# Patient Record
Sex: MALE | Race: BLACK OR AFRICAN AMERICAN | Employment: OTHER | URBAN - METROPOLITAN AREA
[De-identification: names, ages, dates, MRNs, and addresses within clinical notes are randomized per-mention and may not be internally consistent; named-entity substitution may affect disease eponyms.]

---

## 2017-10-12 ENCOUNTER — HOSPITAL ENCOUNTER (OUTPATIENT)
Dept: GENERAL RADIOLOGY | Age: 62
Discharge: HOME | End: 2017-10-12
Payer: OTHER GOVERNMENT

## 2017-10-12 DIAGNOSIS — M79.605 LEFT LEG PAIN: ICD-10-CM

## 2017-10-12 PROCEDURE — 73590 X-RAY EXAM OF LOWER LEG: CPT

## 2017-11-03 ENCOUNTER — HOSPITAL ENCOUNTER (OUTPATIENT)
Dept: CT IMAGING | Age: 62
Discharge: HOME | End: 2017-11-03
Attending: HOSPITALIST
Payer: OTHER GOVERNMENT

## 2017-11-03 ENCOUNTER — HOSPITAL ENCOUNTER (OUTPATIENT)
Dept: MRI IMAGING | Age: 62
Discharge: HOME | End: 2017-11-03
Attending: HOSPITALIST
Payer: OTHER GOVERNMENT

## 2017-11-03 VITALS — WEIGHT: 160 LBS

## 2017-11-03 DIAGNOSIS — R26.89 OTHER ABNORMALITIES OF GAIT AND MOBILITY: ICD-10-CM

## 2017-11-03 DIAGNOSIS — M79.605 LEFT LEG PAIN: ICD-10-CM

## 2017-11-03 DIAGNOSIS — R41.3 OTHER AMNESIA: ICD-10-CM

## 2017-11-03 LAB — CREAT UR-MCNC: 1.4 MG/DL (ref 0.6–1.3)

## 2017-11-03 PROCEDURE — 82565 ASSAY OF CREATININE: CPT

## 2017-11-03 PROCEDURE — 73720 MRI LWR EXTREMITY W/O&W/DYE: CPT

## 2017-11-03 PROCEDURE — A9577 INJ MULTIHANCE: HCPCS | Performed by: HOSPITALIST

## 2017-11-03 PROCEDURE — 74011250636 HC RX REV CODE- 250/636: Performed by: HOSPITALIST

## 2017-11-03 PROCEDURE — 70450 CT HEAD/BRAIN W/O DYE: CPT

## 2017-11-03 RX ADMIN — GADOBENATE DIMEGLUMINE 15 ML: 529 INJECTION, SOLUTION INTRAVENOUS at 09:59

## 2017-12-20 ENCOUNTER — HOSPITAL ENCOUNTER (OUTPATIENT)
Dept: PHYSICAL THERAPY | Age: 62
Discharge: HOME | End: 2017-12-20
Payer: OTHER GOVERNMENT

## 2017-12-20 PROCEDURE — 97162 PT EVAL MOD COMPLEX 30 MIN: CPT

## 2017-12-20 PROCEDURE — 97116 GAIT TRAINING THERAPY: CPT

## 2017-12-20 NOTE — PROGRESS NOTES
[facility]  [address] [place] [address], [address] - Phone: [phone]  Fax: [phone] / [address] [address] PHYSICAL THERAPY SERVICES  Patient Name: Kit Medeiros : 1955   Medical   Diagnosis: Gait instability [R26.81] Treatment Diagnosis: Gait instability [R26.81]   Onset Date: Summer 2017     Referral Source: Santana Reynolds DO Start of Care [facility]): 2017   Prior Hospitalization: See medical history Provider #: [id number]   Prior Level of Function: Independent with ADLs, ambulation   Comorbidities: Arthritis, hypothyroidism, HTN, urinary incontinence, possible CVA   Medications: Verified on Patient Summary List   The Plan of Care and following information is based on the information from the initial evaluation.   ===========================================================================================  Assessment / key information:  Patient is a 60 y.o. male who presents with complaints of imbalance, gait abnormality, multiple falls, B LE swelling L lower leg pain, cognitive deficits and urinary incontinence, which he and his sister report began summer 2017. Patient demonstrates slouched posture; shuffling gait pattern with decreased B foot clearance, decreased B step length and decreased stance time L LE; decreased B LE strength; decreased coordination; decreased balance (Romberg = 30\" eyes open, 22\" eyes closed) and impaired functional mobility/gait (Dynamic Gait Index = 12/24, indicating increased risk of falling). FOTO = 49/100. Patient was ambulating without assistive device, but was educated on proper technique for ambulation with SPC (which patient's sister brought to evaluation) to increase safety with ambulation. Patient would benefit from skilled PT services to address these issues and improve function.   Thank you for this referral.  ==========================================================================================  Eval Complexity: History: HIGH Complexity :3+ comorbidities / personal factors will impact the outcome/ POC Exam:HIGH Complexity : 4+ Standardized tests and measures addressing body structure, function, activity limitation and / or participation in recreation  Presentation: HIGH Complexity : Unstable and unpredictable characteristics  Clinical Decision Making:MEDIUM Complexity : FOTO score of 26-74Overall Complexity:MEDIUM    Problem List: pain affecting function, decrease ROM, decrease strength, edema affecting function, impaired gait/ balance, decrease ADL/ functional abilitiies, decrease activity tolerance, decrease flexibility/ joint mobility and decrease transfer abilities   Treatment Plan may include any combination of the following: Therapeutic exercise, Therapeutic activities, Neuromuscular re-education, Physical agent/modality, Gait/balance training, Manual therapy, Patient education, Functional mobility training, Home safety training and Stair training  Patient / Family readiness to learn indicated by: asking questions, trying to perform skills and interest  Persons(s) to be included in education: patient (P) and family support person (FSP);list sister  Barriers to Learning/Limitations: yes;  cognitive  Measures taken:    Patient Goal (s): \"Better balance, better walking. \"   Patient self reported health status: fair  Rehabilitation Potential: good   Short Term Goals: To be accomplished in  2  weeks:  1. Patient will demonstrate compliance with HEP. 2. Patient will maintain Romberg eyes closed 30\" to increase safety in challenging environments. 3. Patient will score greater than or equal to 14/24 on DGI to indicate increased balance with ambulation.  Long Term Goals: To be accomplished in  4  weeks:  1. Patient will demonstrate independence with HEP.   2. Patient will score greater than or equal to 16/24 on DGI to indicate increased balance with ambulation. 3. Patient will score greater than or equal to 59/100 on FOTO to indicate improved function. Frequency / Duration:   Patient to be seen  2  times per week for 4  weeks:  Patient / Caregiver education and instruction: activity modification and other ambulation with [facility]    Therapist Signature: Searfin Sheppard PT Date: 56/20/8634   Certification Period: NA Time: 5:23 PM   ===========================================================================================  I certify that the above Physical Therapy Services are being furnished while the patient is under my care. I agree with the treatment plan and certify that this therapy is necessary. Physician Signature:        Date:       Time:     Please sign and return to [employer] or you may fax the signed copy to [phone]. Thank you.

## 2017-12-20 NOTE — PROGRESS NOTES
PHYSICAL THERAPY - DAILY TREATMENT NOTE    Patient Name: Naty Keller        Date: 2017  : 1955   YES Patient  Verified  Visit #:   1     Insurance: Payor: Tonny Born / Plan: Naa Sers / Product Type: PPO /      In time: 4:05 Out time: 5:00   Total Treatment Time: 55     Medicare Time Tracking (below)   Total Timed Codes (min):  NA 1:1 Treatment Time:  NA     TREATMENT AREA =  Gait abnormality    SUBJECTIVE    Pain Level (on 0 to 10 scale):  0  / 10   Medication Changes/New allergies or changes in medical history, any new surgeries or procedures? NO    If yes, update Summary List   Subjective Functional Status/Changes:  []  No changes reported     Pt and pt's sister report difficulties with ambulation since summer 2017. Pt and pt's sister report that he has had LE swelling since summer 2017. Pt's sister also reports weight loss over same period of time. Pt's sister reports that pt was living alone, but has came to live with family in summer 2017 due to pt not taking care of himself and having a decline in function. Pt's sister reports that physician told them that pt may have had a stroke, but reports that CT scan was negative other than loss of volume. Pt is scheduled for MRI, which has not yet been approved. Pt's sister reports that he saw orthopedic surgeon, who told them that pt has arthritis in knees and fluid in his legs. Pt's sister reports that pt was offered cortisone injection for knees, but pt declined. Pt denies pain initially then admits to L lower leg pain (intermittent), denies numbness/tingling, denies dizziness. Pt reports imbalance. Pt reports that he has had multiple falls, unsure of cause. Pt reports that he does not use an assistive device. Pt lives with family in [facility], no [facility]. Pt's sister reports that there are 3 canes in home that are available to pt (quad cane, 2 canes).   Pt's sister reports difficulty with memory and understanding instructions.             OBJECTIVE    Physical Therapy Evaluation - Neurologic    Posture: [x] Poor    [] Fair    [] Good    Describe:   Protracted head/shoulders, slouched posture    Gait: [] Normal    [x] Abnormal    Device:  None    Describe: Shuffling gait pattern with decreased B foot clearance, decreased B step length    ROM:                             AROM    PROM   Shoulder Left Right Left Right   Flex [facility]     Ext [facility] WFL     ABD [facility]     ER [facility] W[facility]     IR WFL WFL              AROM    PROM   Knee Left Right Left Right   Ext [facility]     Flex [facility] WFL               AROM                           PROM  Hip Left Right Left Right   Flex [facility] WFL     Ext [facility] WFL     ABD [facility]     ER WFL WFL     IR WFL WFL                                              AROM      PROM   Ankle Left Right Left Right   Ext [facility]     Flex [facility] WFL       Strength (MMT):  Shoulder L (1-5) R (1-5)   Shoulder Flexion 5 5   Shoulder Ext 5 5   Shoulder ABD 5 5   Shoulder ADD 5 5   Shoulder IR 4 4   Shoulder ER 4 4                                             Hip L (1-5) R (1-5)   Hip Flexion 4 4   Hip Ext NT NT   Hip ABD NT NT   Hip ADD NT NT   Hip ER NT NT   Hip IR NT NT     Knee L (1-5) R (1-5)   Knee Flexion 4 4   Knee Extension 4 4   Ankle PF 5 5   Ankle DF 5 5   Other       Tone: WNL    Motor Control: Decreased speed and accuracy of movements    Sensation: NT    Reflexes: [x] Not Tested   Left Right   Biceps (C5)     Brachioradiais (C6)     Triceps (C7)     Knee Jerk (L4)     Ankle Jerk (SI)       Balance/ Equilibrium:              Left            Right  Tracks Across Midline yes  yes   Reaches Across Midline yes yes         Sitting Balance: Static:  [] Good    [x] Fair    [] Poor     Dynamic:   [] Good    [x] Fair    [] Poor        Standing Balance: Static:   [] Good    [] Fair    [x] Poor     Dynamic:   [] Good    [] Fair    [x] Poor        Protective Extension:  [x] Present    [] Delayed    [] Absent Romber\"/22\"        Functional Mobility      Bed Mobility:      Scooting: NT       Rolling: NT       Sit-Supine: NT      Transfers:       Sit-Stand: S to CG       Floor-Stand: NT      Gait:       Tandem: NT       Backwards: NT       Braiding: NT      Elevations:       Curbs: NT       Ramps: NT       Stairs: DGI    Behavior: [x] Cooperative    [] Impulsive    [] Agitated    [] Perseverative    [] Confused   Oriented x: 3    Cognition: [x] One Step Commands   [] Multiple Commands   [] Displays Neglect [] R  [] L    Other:       Impaired Judgement: [x] Y    [] N      Impaired Vision:  [] Y    [x] N      Safety Awareness Deficits  [x] Y    [] N      Impaired Hearing  [] Y    [x] N      Able to Express Needs [x] Y    [] N    Optional Tests:       Dynamic Gait Index (24pt scale):        Functional Gait Assessment (30pt scale):       Briggs Balance Scale (56pt scale):     Other test /comments:    15 min Gait Training: Gait training with SPC, SBQC; adjustment of pt's SPC   Rationale: improve gait to improve the patient's ability to perform ADLs/IADLs, functional mobility and gait safely and independently wtihout increased pain/symptoms        During eval, GT min Patient Education:  NO  Reviewed HEP   []  Progressed/Changed HEP based on:   Discussed POC; advised to use [facility] for safety with ambulation and instructed in proper technique     Other Objective/Functional Measures:    Instructed in proper technique for ambulation with SPC, SBQC; demonstrated improved safety with [facility] compared to [facility] due to keeping SBQC to close to body, posing a trip hazard; adjusted pt's SPC to proper height     Post Treatment Pain Level (on 0 to 10) scale:   0  / 10     ASSESSMENT    Assessment/Changes in Function:     Justification for Eval Code Complexity:  Patient History : HIGH - arthritis, hypothyroidism, HTN, possible CVA, urinary incontinence  Examination HIGH - See objective  Clinical Presentation: HIGH  Clinical Decision Making : MEDIUM - FOTO 49/100    See plan of care     []  See Progress Note/Recertification   Patient will continue to benefit from skilled PT services: see plan of care   Progress toward goals / Updated goals:    See plan of care     PLAN    [x]  Upgrade activities as tolerated YES Continue plan of care   []  Discharge due to :    []  Other:      Therapist: Adelina Pickett, PT    Date: 12/20/2017 Time: 4:10 PM

## 2017-12-27 ENCOUNTER — HOSPITAL ENCOUNTER (OUTPATIENT)
Dept: PHYSICAL THERAPY | Age: 62
Discharge: HOME | End: 2017-12-27
Payer: OTHER GOVERNMENT

## 2017-12-27 PROCEDURE — 97110 THERAPEUTIC EXERCISES: CPT

## 2017-12-27 PROCEDURE — 97530 THERAPEUTIC ACTIVITIES: CPT

## 2017-12-27 NOTE — PROGRESS NOTES
PHYSICAL THERAPY - DAILY TREATMENT NOTE    Patient Name: Linda Killian        Date: 2017  : 1955   YES Patient  Verified  Visit #:   2     Insurance: Payor: Yoli Lopes / Plan: Susy Jovel / Product Type: PPO /      In time: 5:30 Out time: 6:00   Total Treatment Time: 30     Medicare Time Tracking (below)   Total Timed Codes (min):  NA 1:1 Treatment Time:  NA     TREATMENT AREA =  Gait instability [R26.81]  SUBJECTIVE    Pain Level (on 0 to 10 scale):  0  / 10   Medication Changes/New allergies or changes in medical history, any new surgeries or procedures? NO    If yes, update Summary List   Subjective Functional Status/Changes:  []  No changes reported     Pt without complaints.           OBJECTIVE    15 min Therapeutic Activity: Sit to stand, gait with SPC   Rationale: improve transfers and gait to improve the patient's ability to perform ADLs/IADLs, functional mobility and gait safely and independently without increased pain/symptoms      15 min Neuromuscular Re-ed: EO/EC balance, EO/EC balance on foam, tap-ups, LAQ, stand knee flex, mini-squat   Rationale: increase strength, improve coordination and improve balance to improve the patients ability to perform ADLs/IADLs, functional mobility and gait safely and independently without increased pain/symptoms       During TE/TA min Patient Education:  YES  Reviewed HEP   [x]  Progressed/Changed HEP based on:   Initiated HEP     Other Objective/Functional Measures:    Initiated balance/strengthening ex and transfer/gait training per flowsheet  Patient demonstrated flexed posture, decreased step length and decreased B foot clearance with ambulation with SPC, which improved slightly with verbal cues     Post Treatment Pain Level (on 0 to 10) scale:   0  / 10     ASSESSMENT    Assessment/Changes in Function:     Tolerated exercises without complaints     []  See Progress Note/Recertification   Patient will continue to benefit from skilled PT services to modify and progress therapeutic interventions, address functional mobility deficits, address ROM deficits, address strength deficits, analyze and address soft tissue restrictions, analyze and cue movement patterns, analyze and modify body mechanics/ergonomics, assess and modify postural abnormalities, address imbalance/dizziness and instruct in home and community integration to attain remaining goals. Progress toward goals / Updated goals:    Progressing slowly toward goals - first visit since evaluation:  1. Patient will demonstrate compliance with HEP. - Initiated HEP  2. Patient will maintain Romberg eyes closed 30\" to increase safety in challenging environments. - MET  3. Patient will score greater than or equal to 14/24 on DGI to indicate increased balance with ambulation.        PLAN    [x]  Upgrade activities as tolerated YES Continue plan of care   []  Discharge due to :    []  Other:      Therapist: Ritu Cook PT    Date: 12/27/2017 Time: 5:52 PM     Future Appointments  Date Time Provider Chinyere Hinds   1/3/2018 5:30 PM Ritu Cook PT [facility]   1/8/2018 5:30 PM Christine Yeboah PT [facility]   1/10/2018 5:30 PM Ritu Cook, [address] [place] [address]   1/15/2018 5:30 PM Christine Yeboah PT [facility]   1/17/2018 5:30 PM Ritu Cook, [address] [place] [address]   1/24/2018 5:30 PM Ritu Cook PT [facility]   1/29/2018 5:30 PM Christine Yeboah PT [facility]   1/31/2018 5:30 PM Ritu Cook, PT [facility]

## 2018-01-03 ENCOUNTER — HOSPITAL ENCOUNTER (OUTPATIENT)
Dept: PHYSICAL THERAPY | Age: 63
Discharge: HOME | End: 2018-01-03
Payer: OTHER GOVERNMENT

## 2018-01-03 PROCEDURE — 97112 NEUROMUSCULAR REEDUCATION: CPT

## 2018-01-03 PROCEDURE — 97110 THERAPEUTIC EXERCISES: CPT

## 2018-01-08 ENCOUNTER — APPOINTMENT (OUTPATIENT)
Dept: PHYSICAL THERAPY | Age: 63
End: 2018-01-08
Payer: OTHER GOVERNMENT

## 2018-01-10 ENCOUNTER — APPOINTMENT (OUTPATIENT)
Dept: PHYSICAL THERAPY | Age: 63
End: 2018-01-10
Payer: OTHER GOVERNMENT

## 2018-01-15 ENCOUNTER — HOSPITAL ENCOUNTER (OUTPATIENT)
Dept: PHYSICAL THERAPY | Age: 63
Discharge: HOME | End: 2018-01-15
Payer: OTHER GOVERNMENT

## 2018-01-15 PROCEDURE — 97112 NEUROMUSCULAR REEDUCATION: CPT

## 2018-01-15 NOTE — PROGRESS NOTES
PHYSICAL THERAPY - DAILY TREATMENT NOTE    Patient Name: Bria Alfonso        Date: 1/15/2018  : 1955   YES Patient  Verified  Visit #:   4     Insurance: Payor: Cadence Shi / Plan: Abraham Holt / Product Type: PPO /      In time: 4:35 Out time: 5:05   Total Treatment Time: 30     Medicare Time Tracking (below)   Total Timed Codes (min):  NA 1:1 Treatment Time:  NA     TREATMENT AREA =  Gait instability [R26.81]    SUBJECTIVE    Pain Level (on 0 to 10 scale):  0  / 10   Medication Changes/New allergies or changes in medical history, any new surgeries or procedures? NO    If yes, update Summary List   Subjective Functional Status/Changes:  []  No changes reported     Reports he is not doing HEP because he has trouble remembering to do the exercises. Reports no falls since last treatment. OBJECTIVE    30 min Neuromuscular Re-ed:    Rationale:      increase ROM, increase strength, improve coordination, improve balance and increase proprioception to improve the patients ability to perform ADL's, gait, and functional mobility with increased safety and independence. min Patient Education:  YES  Reviewed HEP   []  Progressed/Changed HEP based on:   Cont HEP - advised patient to place HEP in a place where it is visible often to help him remember to do the exercises     Other Objective/Functional Measures:    Patient required physical assistance from therapist for MSR(instep) foot placement. EO MSR(instep): 30\" B  EC ROM: 30\"  EC MSR(instep): L 7\"/R 30\"    EO on AirEx stance: 30\" (Patient denied ROM on AirEx)  Patient denied EC on AirEx. Educated patient on placement of SPC to decrease fall risk. Post Treatment Pain Level (on 0 to 10) scale:   0  / 10     ASSESSMENT    Assessment/Changes in Function:     Patient demonstrated decreased stride length B when asked to multitask. Decreased foot clearance B with gait.      []  See Progress Note/Recertification   Patient will continue to benefit from skilled PT services to modify and progress therapeutic interventions, address functional mobility deficits, address ROM deficits, address strength deficits, analyze and address soft tissue restrictions, analyze and cue movement patterns, analyze and modify body mechanics/ergonomics, assess and modify postural abnormalities, address imbalance/dizziness and instruct in home and community integration to attain remaining goals. Progress toward goals / Updated goals:    Progressing toward goals:  1. Patient will demonstrate compliance with HEP. Cont HEP - advised him to place HEP in a place where it is visible often to help him remember to do exercises. 2. Patient will maintain Romberg eyes closed 30\" to increase safety in challenging environments. - MET  3. Patient will score greater than or equal to 14/24 on DGI to indicate increased balance with ambulation. Cont balance and strengthening exercises.      PLAN    [x]  Upgrade activities as tolerated YES Continue plan of care   []  Discharge due to :    []  Other:      Therapist: Dhara Singh PT    Date: 1/15/2018 Time: 4:48 PM     Future Appointments  Date Time Provider Chinyere Hinds   1/17/2018 5:30 PM Jeff Ferrer [address] [place] [address]   1/24/2018 5:30 PM Jeff Ferrer [address] [place] [address]   1/29/2018 5:30 PM Dhara Singh PT [facility]   1/31/2018 5:30 PM Jeff Ferrer PT [facility]

## 2018-01-24 ENCOUNTER — HOSPITAL ENCOUNTER (OUTPATIENT)
Dept: PHYSICAL THERAPY | Age: 63
Discharge: HOME | End: 2018-01-24
Payer: OTHER GOVERNMENT

## 2018-01-24 PROCEDURE — 97110 THERAPEUTIC EXERCISES: CPT

## 2018-01-24 PROCEDURE — 97530 THERAPEUTIC ACTIVITIES: CPT

## 2018-01-24 NOTE — PROGRESS NOTES
PHYSICAL THERAPY - DAILY TREATMENT NOTE    Patient Name: Jericho Bravo        Date: 2018  : 1955   YES Patient  Verified  Visit #:   5     Insurance: Payor: Tina Stoddard / Plan: Sammie Huertas / Product Type: PPO /      In time: 5:30 Out time: 6:05   Total Treatment Time: 35     Medicare Time Tracking (below)   Total Timed Codes (min):  NA 1:1 Treatment Time:  NA     TREATMENT AREA =  Gait instability [R26.81]  SUBJECTIVE    Pain Level (on 0 to 10 scale):  0  / 10   Medication Changes/New allergies or changes in medical history, any new surgeries or procedures? NO    If yes, update Summary List   Subjective Functional Status/Changes:  []  No changes reported     Pt reports that he would like to work on standing and lifting weights to get stronger.           OBJECTIVE    15 min Therapeutic Exercise:  [x]  See flow sheet   Rationale:      increase ROM, increase strength, improve coordination, improve balance and increase proprioception to improve the patients ability to perform ADLs/IADLs, functional mobility and gait safely and independently without increased pain/symptoms     20 min Therapeutic Activity: FOTO, DGI   Rationale: assess ADL/IADL function, functional mobility and gait to improve the patient's ability to perform ADLs/IADLs, functional mobility and gait safely and independently without increased pain/symptoms      DuringTE min Patient Education:  YES  Reviewed HEP   []  Progressed/Changed HEP based on:   Cont HEP     Other Objective/Functional Measures:    Nustep limited to 3.5 minutes due to c/o fatigue - O2 sat = 97%, HR = 90  Increased reps step-ups    B hip flex = 4/5, B knee flex/ext = 5/5, B ankle DF = 5/5    DGI =  with SPC; posterior LOB while ascending stairs, requiring min A from PT to correct    FOTO = 74/100     Post Treatment Pain Level (on 0 to 10) scale:   0  / 10     ASSESSMENT    Assessment/Changes in Function:     See progress note     [x]  See Progress Note/Recertification   Patient will continue to benefit from skilled PT services: see progress note   Progress toward goals / Updated goals:    See progress note     PLAN    [x]  Upgrade activities as tolerated YES Continue plan of care   []  Discharge due to :    []  Other:      Therapist: Beverly Gomez PT    Date: 1/24/2018 Time: 5:44 PM     Future Appointments  Date Time Provider Chinyere Hinds   1/29/2018 5:30 PM Reina Parks, PT [facility]   1/31/2018 5:30 PM Beverly Gomez, [address] [place] [address]   2/5/2018 5:30 PM Reina Parks, [address] [place] [address]   2/7/2018 5:30 PM Beverly Gomez, [address] [place] [address]   2/12/2018 5:30 PM Reina Parks, PT [facility]   2/14/2018 5:30 PM Beverly Gomez, [address] [place] [address]   2/19/2018 5:30 PM Reina Parks PT [facility]   2/21/2018 5:30 PM Beverly Gomez [address] [place] [address]   2/26/2018 5:30 PM Reina Parks PT [facility]   2/28/2018 5:30 PM Beverly Gomez PT [facility]

## 2018-01-24 NOTE — PROGRESS NOTES
[address] PHYSICAL THERAPY  [address] [place] [address] Victoriano Escobedo, [address] [employer] - Phone: [phone]  Fax: [phone]  PROGRESS NOTE  Patient Name: Carola Martin : 1955   Treatment/Medical Diagnosis: Gait instability [R26.81]   Referral Source: Jerome Dhaliwal DO     Date of Initial Visit: 2017 Attended Visits: 5 Missed Visits: 2     SUMMARY OF TREATMENT  Treatment has consisted of initial evaluation and 4 treatment sessions, which have included LE strengthening exercises, balance training, functional mobility/gait training and patient/family education, including HEP. CURRENT STATUS  Patient has demonstrated limited progress with PT to this point, possibly due to decreased carryover due to cognitive deficits and limited attendance due to inclement weather. Patient has progressed slowly with exercises in clinic. Patient reports poor compliance with HEP, possibly due to cognitive deficits. FOTO has increased from 49/100 to 74/100, suggesting improved function/decreased functional limitation; however, validity of results potentially affected by cognitive deficits. Dynamic Gait Index (DGI) has decreased slightly, but not significantly, from  to . Patient demonstrates posterior LOB during stair negotiation portion of DGI, requiring min A from PT to correct. Patient ambulates in clinic with SPC, intermittently carrying SPC instead of placing it on ground; however, patient's sister reports that patient does not use SPC at home. Static standing balance has improved slightly with patient able to maintain modified sharpened Romberg with heel at instep eyes open 30\"B, Romberg with eyes closed 30\" and foam stance eyes open 30\". LE strength increased slightly with B hip flex = 4/5, B knee flex/ext = 5/5 and B ankle DF = 5/5. Goal/Measure of Progress Goal Met? 1. Patient will demonstrate compliance with HEP.    Status at last Eval: NA Current Status: Poor compliance with HEP no   2. Patient will maintain Romberg eyes closed 30\" to increase safety in challenging environments. Status at last Eval: Rom EC = 22\" Current Status: Rom EC = 30\" yes   3. Patient will score greater than or equal to 14/24 on DGI to indicate increased balance with ambulation. Status at last Eval: DGI = 12/24 Current Status: DGI = 11/24 no     New Goals to be achieved in __2-4__  weeks:  1. Patient will demonstrate independence with HEP. 2.  Patient will score greater than or equal to 16/24 on DGI to indicate increased balance with ambulation. 3.  Patient will score greater than or equal to 59/100 on FOTO to indicate improved function. RECOMMENDATIONS  Patient may benefit from continued skilled PT services to address decreased strength, decreased balance, decreased activity tolerance and impaired functional mobility/gait to allow increased safety and independence with ADLs/IADLs, functional mobility and gait. Cognitive deficits and inconsistent attendance are potential barriers to progress. Will reassess progress in 2 weeks. If you have any questions/comments please contact us directly at [phone]. Thank you for allowing us to assist in the care of your patient. Therapist Signature: Misty Guallpa PT Date: 1/24/2018     Time: 6:12 PM   NOTE TO PHYSICIAN:  PLEASE COMPLETE THE ORDERS BELOW AND FAX TO   [facility] Physical Therapy: [phone]. If you are unable to process this request in 24 hours please contact our office: [phone].    ___ I have read the above report and request that my patient continue as recommended.   ___ I have read the above report and request that my patient continue therapy with the following changes/special instructions:_________________________________________________________   ___ I have read the above report and request that my patient be discharged from therapy.      Physician Signature:        Date: Time:

## 2018-01-29 ENCOUNTER — HOSPITAL ENCOUNTER (OUTPATIENT)
Dept: PHYSICAL THERAPY | Age: 63
Discharge: HOME | End: 2018-01-29
Payer: OTHER GOVERNMENT

## 2018-01-29 PROCEDURE — 97112 NEUROMUSCULAR REEDUCATION: CPT

## 2018-01-29 NOTE — PROGRESS NOTES
PHYSICAL THERAPY - DAILY TREATMENT NOTE    Patient Name: Silas Stoll        Date: 2018  : 1955   YES Patient  Verified  Visit #:     Insurance: Payor: Acacia Dave / Plan: Wendy Sepulveda / Product Type: PPO /      In time: 5:35 Out time: 5:55   Total Treatment Time: 20     Medicare Time Tracking (below)   Total Timed Codes (min):  NA 1:1 Treatment Time:  NA     TREATMENT AREA =  Gait instability [R26.81]    SUBJECTIVE    Pain Level (on 0 to 10 scale):  0  / 10   Medication Changes/New allergies or changes in medical history, any new surgeries or procedures? NO    If yes, update Summary List   Subjective Functional Status/Changes:  []  No changes reported     Reports he is doing his exercises 1-2x/day. Reports his main problem is \"getting here. \"  \"My sister wants to stop bringing me here. \"   Reports no falls since last treatment. States he still feels off balance at home. \"I don't move fast enough. \"  When asked if he was safe when he is walking, he replies \"yes. \"         OBJECTIVE    20 min Neuromuscular Re-ed:    Rationale:      improve coordination, improve balance and increase proprioception to improve the patients ability to perform ADL's, gait, and functional mobility with increased safety and independence.        min Patient Education:  YES  Reviewed HEP   []  Progressed/Changed HEP based on:   Advised to cont HEP     Other Objective/Functional Measures:    DGI = 12/24     Post Treatment Pain Level (on 0 to 10) scale:   0  / 10     ASSESSMENT    Assessment/Changes in Function:     See DC note to Md     []  See Progress Note/Recertification      Progress toward goals / Updated goals:    See DC note to MD     PLAN    [x]  Upgrade activities as tolerated YES Continue plan of care   []  Discharge due to :    []  Other:      Therapist: Reina Parks PT    Date: 2018 Time: 1:18 PM     Future Appointments  Date Time Provider Chinyere Hinds   2018 5:30 PM Stevie Zavala, PT [facility]   1/31/2018 5:30 PM Cheri Courser, PT [facility]   2/5/2018 5:30 PM Stevie Zavala, PT [facility]   2/7/2018 5:30 PM Cheri Courser, PT [facility]   2/12/2018 5:30 PM Stevie Zavala, PT [facility]   2/14/2018 5:30 PM Cheri Courser, [facility]   2/19/2018 5:30 PM Stevie Zavala, PT [facility]   2/21/2018 5:30 PM Hceri Courser, PT [facility]   2/26/2018 5:30 PM Stevie Zavala, PT [facility]   2/28/2018 5:30 PM Cheri Courser, PT [facility]

## 2018-01-29 NOTE — PROGRESS NOTES
[address] PHYSICAL THERAPY  [address] [place] [address] Nina Escobedo, [address] - Phone: [phone]  Fax: [phone] SUMMARY  Patient Name: Latosha Shin : 1955   Treatment/Medical Diagnosis: Gait instability [R26.81]   Referral Source: Emily Rivers DO     Date of Initial Visit: 2017 Attended Visits: 6 Missed Visits: 2     SUMMARY OF TREATMENT  Treatment has consisted of LE strengthening exercises, balance training, patient/family education, and home exercise program.  CURRENT STATUS  Patient presented to the clinic today requesting discharge due to transportation issues. He states, \"My sister doesn't want to bring me anymore. \"  Please refer to progress note dated 2018 for objective data. The Dynamic Gait Index was repeated today with a score of 12/ (patient able to negotiate steps today without min A), indicating increased fall risk. Goal/Measure of Progress Goal Met? 1. Patient will demonstrate independence with HEP. Status at last Eval: Poor compliance with HEP Current Status: Partial compliance with HEP no   2. Patient will score greater than or equal to 16/24 on DGI to indicate increased balance with ambulation. Status at last Eval:  Current Status: 12 no   3. Patient will score greater than or equal to 59/100 on FOTO to indicate improved function. Status at last Eval: 49/100 Current Status: 74/100 (on 2018) yes     G-Codes: NA  RECOMMENDATIONS  Discontinue therapy per patient request.    If you have any questions/comments please contact us directly at [phone]. Thank you for allowing us to assist in the care of your patient.     Therapist Signature: Chicho Caba, PT Date: 2018     Time: 5:59 PM

## 2018-01-31 ENCOUNTER — APPOINTMENT (OUTPATIENT)
Dept: PHYSICAL THERAPY | Age: 63
End: 2018-01-31
Payer: OTHER GOVERNMENT

## 2018-02-05 ENCOUNTER — APPOINTMENT (OUTPATIENT)
Dept: PHYSICAL THERAPY | Age: 63
End: 2018-02-05

## 2018-02-06 ENCOUNTER — HOSPITAL ENCOUNTER (OUTPATIENT)
Dept: MRI IMAGING | Age: 63
Discharge: HOME | End: 2018-02-06
Attending: HOSPITALIST
Payer: OTHER GOVERNMENT

## 2018-02-06 ENCOUNTER — APPOINTMENT (OUTPATIENT)
Dept: MRI IMAGING | Age: 63
End: 2018-02-06
Attending: HOSPITALIST
Payer: OTHER GOVERNMENT

## 2018-02-06 ENCOUNTER — HOSPITAL ENCOUNTER (OUTPATIENT)
Dept: NON INVASIVE DIAGNOSTICS | Age: 63
Discharge: HOME | End: 2018-02-06
Attending: NURSE PRACTITIONER
Payer: OTHER GOVERNMENT

## 2018-02-06 VITALS — WEIGHT: 160 LBS

## 2018-02-06 DIAGNOSIS — R60.9 EDEMA: ICD-10-CM

## 2018-02-06 DIAGNOSIS — R41.3 OTHER AMNESIA: ICD-10-CM

## 2018-02-06 LAB — CREAT UR-MCNC: 1.4 MG/DL (ref 0.6–1.3)

## 2018-02-06 PROCEDURE — 82565 ASSAY OF CREATININE: CPT

## 2018-02-06 PROCEDURE — 70553 MRI BRAIN STEM W/O & W/DYE: CPT

## 2018-02-06 PROCEDURE — A9577 INJ MULTIHANCE: HCPCS | Performed by: HOSPITALIST

## 2018-02-06 PROCEDURE — 74011250636 HC RX REV CODE- 250/636: Performed by: HOSPITALIST

## 2018-02-06 PROCEDURE — 93306 TTE W/DOPPLER COMPLETE: CPT

## 2018-02-06 RX ADMIN — GADOBENATE DIMEGLUMINE 15 ML: 529 INJECTION, SOLUTION INTRAVENOUS at 08:42

## 2018-02-07 ENCOUNTER — APPOINTMENT (OUTPATIENT)
Dept: PHYSICAL THERAPY | Age: 63
End: 2018-02-07

## 2018-02-12 ENCOUNTER — APPOINTMENT (OUTPATIENT)
Dept: PHYSICAL THERAPY | Age: 63
End: 2018-02-12

## 2018-02-14 ENCOUNTER — APPOINTMENT (OUTPATIENT)
Dept: PHYSICAL THERAPY | Age: 63
End: 2018-02-14

## 2018-02-19 ENCOUNTER — APPOINTMENT (OUTPATIENT)
Dept: PHYSICAL THERAPY | Age: 63
End: 2018-02-19

## 2018-02-21 ENCOUNTER — APPOINTMENT (OUTPATIENT)
Dept: PHYSICAL THERAPY | Age: 63
End: 2018-02-21

## 2018-02-26 ENCOUNTER — APPOINTMENT (OUTPATIENT)
Dept: PHYSICAL THERAPY | Age: 63
End: 2018-02-26

## 2018-02-28 ENCOUNTER — APPOINTMENT (OUTPATIENT)
Dept: PHYSICAL THERAPY | Age: 63
End: 2018-02-28

## 2018-06-02 NOTE — PROGRESS NOTES
PHYSICAL THERAPY - DAILY TREATMENT NOTE    Patient Name: Bob Kirkland        Date: 1/3/2018  : 1955   YES Patient  Verified  Visit #:   3     Insurance: Payor: Олег Gerardoer / Plan: Alivia Bunn / Product Type: PPO /      In time: 5:25 Out time: 6:00   Total Treatment Time: 35     Medicare Time Tracking (below)   Total Timed Codes (min):  NA 1:1 Treatment Time:  NA     TREATMENT AREA =  Gait instability [R26.81]  SUBJECTIVE    Pain Level (on 0 to 10 scale):  0  / 10   Medication Changes/New allergies or changes in medical history, any new surgeries or procedures? NO    If yes, update Summary List   Subjective Functional Status/Changes:  []  No changes reported     Pt without complaints. Pt reports that he has not been doing HEP, reports that he lost HEP printouts.           OBJECTIVE    27 min Therapeutic Exercise:  [x]  See flow sheet   Rationale:      increase ROM, increase strength, improve coordination, improve balance and increase proprioception to improve the patients ability to perform ADLs/IADLs, functional mobility and gait safely and independently without increased pain/symptoms     8 min Neuromuscular Re-ed: EO/EC balance, tap-ups   Rationale: improve balance to improve the patient's ability to perform ADLs/IADLs, functional mobility and gait safely and independently without increased pain/symptoms      During TE/NM min Patient Education:  YES  Reviewed HEP   [x]  Progressed/Changed HEP based on:   Issued new copy of HEP     Other Objective/Functional Measures:    Initiated Nustep  Initiated stand hip 3-way, step-ups  Progressed EO balance   Post Treatment Pain Level (on 0 to 10) scale:   0  / 10     ASSESSMENT    Assessment/Changes in Function:     Tolerated exercises without complaints     []  See Progress Note/Recertification   Patient will continue to benefit from skilled PT services to modify and progress therapeutic interventions, address functional mobility deficits, address ROM deficits, address strength deficits, analyze and address soft tissue restrictions, analyze and cue movement patterns, analyze and modify body mechanics/ergonomics, assess and modify postural abnormalities, address imbalance/dizziness and instruct in home and community integration to attain remaining goals. Progress toward goals / Updated goals:    Progressing toward goals:  1. Patient will demonstrate compliance with HEP. - Reissued HEP  2. Patient will maintain Romberg eyes closed 30\" to increase safety in challenging environments. - MET  3. Patient will score greater than or equal to 14/24 on DGI to indicate increased balance with ambulation.        PLAN    [x]  Upgrade activities as tolerated YES Continue plan of care   []  Discharge due to :    []  Other:      Therapist: Joaquim Levy PT    Date: 1/3/2018 Time: 5:25 PM     Future Appointments  Date Time Provider Chinyere Hinds   1/3/2018 5:30 PM Joaquim Levy PT [facility]   1/8/2018 5:30 PM Shailesh Hale PT [facility]   1/10/2018 5:30 PM Joaquim Levy [address] [place] [address]   1/15/2018 5:30 PM Shailesh Hale PT [facility]   1/17/2018 5:30 PM Joaquim Levy [address] [place] [address]   1/24/2018 5:30 PM Joaquim Levy PT [facility]   1/29/2018 5:30 PM Shailesh Hale PT [facility]   1/31/2018 5:30 PM Joaquim Levy PT [facility] Adequate: hears normal conversation without difficulty

## 2018-06-22 ENCOUNTER — HOSPITAL ENCOUNTER (OUTPATIENT)
Dept: MRI IMAGING | Age: 63
Discharge: HOME | End: 2018-06-22
Attending: PSYCHIATRY & NEUROLOGY
Payer: COMMERCIAL

## 2018-06-22 VITALS — WEIGHT: 150 LBS

## 2018-06-22 DIAGNOSIS — R26.0 ATAXIC GAIT: ICD-10-CM

## 2018-06-22 LAB — CREAT UR-MCNC: 1.2 MG/DL (ref 0.6–1.3)

## 2018-06-22 PROCEDURE — 74011250636 HC RX REV CODE- 250/636: Performed by: PSYCHIATRY & NEUROLOGY

## 2018-06-22 PROCEDURE — A9575 INJ GADOTERATE MEGLUMI 0.1ML: HCPCS | Performed by: PSYCHIATRY & NEUROLOGY

## 2018-06-22 PROCEDURE — 72156 MRI NECK SPINE W/O & W/DYE: CPT

## 2018-06-22 PROCEDURE — 82565 ASSAY OF CREATININE: CPT

## 2018-06-22 RX ORDER — GADOTERATE MEGLUMINE 376.9 MG/ML
10 INJECTION INTRAVENOUS
Status: COMPLETED | OUTPATIENT
Start: 2018-06-22 | End: 2018-06-22

## 2018-06-22 RX ADMIN — GADOTERATE MEGLUMINE 10 ML: 376.9 INJECTION INTRAVENOUS at 09:53

## 2018-08-22 PROBLEM — R97.20 ELEVATED PSA: Status: ACTIVE | Noted: 2018-08-22

## 2018-11-09 ENCOUNTER — DOCUMENTATION ONLY (OUTPATIENT)
Dept: INTERNAL MEDICINE CLINIC | Age: 63
End: 2018-11-09

## 2018-11-09 ENCOUNTER — OFFICE VISIT (OUTPATIENT)
Dept: INTERNAL MEDICINE CLINIC | Age: 63
End: 2018-11-09

## 2018-11-09 VITALS
SYSTOLIC BLOOD PRESSURE: 141 MMHG | WEIGHT: 177.4 LBS | HEART RATE: 66 BPM | TEMPERATURE: 97.5 F | OXYGEN SATURATION: 98 % | BODY MASS INDEX: 26.28 KG/M2 | DIASTOLIC BLOOD PRESSURE: 82 MMHG | HEIGHT: 69 IN | RESPIRATION RATE: 20 BRPM

## 2018-11-09 DIAGNOSIS — R97.20 ELEVATED PSA: ICD-10-CM

## 2018-11-09 DIAGNOSIS — Z23 ENCOUNTER FOR IMMUNIZATION: ICD-10-CM

## 2018-11-09 DIAGNOSIS — F01.50 VASCULAR DEMENTIA WITHOUT BEHAVIORAL DISTURBANCE (HCC): Primary | ICD-10-CM

## 2018-11-09 DIAGNOSIS — E78.5 HYPERLIPIDEMIA, UNSPECIFIED HYPERLIPIDEMIA TYPE: ICD-10-CM

## 2018-11-09 RX ORDER — ATORVASTATIN CALCIUM 80 MG/1
80 TABLET, FILM COATED ORAL DAILY
Qty: 90 TAB | Refills: 3 | Status: SHIPPED | OUTPATIENT
Start: 2018-11-09 | End: 2021-03-30 | Stop reason: SDUPTHER

## 2018-11-09 NOTE — PATIENT INSTRUCTIONS
Helping A Person With Dementia: Care Instructions  Your Care Instructions    Dementia is a loss of mental skills that affects daily life. It is different from mild memory loss that occurs with aging. Dementia can cause problems with memory, thinking clearly, and planning. It is different for everyone. But it usually gets worse slowly. Some people who have dementia can function well for a long time. But at some point it may become hard for the person to care for himself or herself. It can be upsetting to learn that a loved one has this condition. You may be afraid and worried about what will happen. You may wonder how you will care for the person. There is no cure for dementia. But medicine may be able to slow memory loss and improve thinking for a while. Other medicines may help with sleep, depression, and behavior changes. Dementia is different for everyone. In some cases, people can function well for a long time. You can help your loved one by making his or her home life easier and safer. You also need to take care of yourself. Caregiving can be stressful. But support is available to help you and give you a break when you need it. The Alzheimer's Association offers good information and support. If you are caring for someone with dementia, you can help make life safer and more comfortable. You can also help your loved one make decisions about future care. You may also want to bring up legal and financial issues. These are hard but important conversations to have. Follow-up care is a key part of your loved one's treatment and safety. Be sure to make and go to all appointments, and call your doctor if your loved one is having problems. It's also a good idea to know your loved one's test results and keep a list of the medicines he or she takes. How can you care for your loved one at home? Taking care of the person  · If the person takes medicine for dementia, help him or her take it exactly as prescribed. Call the doctor if you notice any problems with the medicine. · Make a list of the person's medicines. Review it with all of his or her doctors. · Help the person eat a balanced diet. Serve plenty of whole grains, fruits, and vegetables every day. If the person is not hungry at mealtimes, give snacks at midmorning and in the afternoon. Offer drinks such as Boost, Ensure, or Sustacal if the person is losing weight. · Encourage exercise. Walking and other activities may slow the decline of mental ability. Help the person stay active mentally with reading, crossword puzzles, or other hobbies. · Take steps to help if the person is sundowning. This is the restless behavior and trouble with sleeping that may occur in late afternoon and at night. Try not to let the person nap during the day. Offer a glass of warm milk or caffeine-free tea before bedtime. · Develop a routine. Your loved one will feel less frustrated or confused with a clear, simple plan of what to do every day. · Be patient. A task may take the person longer than it used to. · For as long as he or she is able, allow your loved one to make decisions about activities, food, clothing, and other choices. Let him or her be independent, even if tasks take more time or are not done perfectly. Tailor tasks to the person's abilities. For example, if cooking is no longer safe, ask for other help. Your loved one can help set the table, or make simple dishes such as a salad. When the person needs help, offer it gently. Staying safe  · Make your home (or your loved one's home) safe. Tack down rugs, and put no-slip tape in the tub. Install handrails, and put safety switches on stoves and appliances. Keep rooms free of clutter. Make sure walkways around furniture are clear. Do not move furniture around, because the person may become confused. · Use locks on doors and cupboards.  Lock up knives, scissors, medicines, cleaning supplies, and other dangerous things. · Do not let the person drive or cook if he or she can't do it safely. A person with dementia should not drive unless he or she is able to pass an on-road driving test. Your state 's license bureau can do a driving test if there is any question. · Get medical alert jewelry for the person so that you can be contacted if he or she wanders away. If possible, provide a safe place for wandering, such as an enclosed yard or garden. Taking care of yourself  · Ask your doctor about support groups and other resources in your area. · Take care of your health. Be sure to eat healthy foods and get enough rest and exercise. · Take time for yourself. Respite services provide someone to stay with the person for a short time while you get out of the house for a few hours. · Make time for an activity that you enjoy. Read, listen to music, paint, do crafts, or play an instrument, even if it's only for a few minutes a day. · Spend time with family, friends, and others in your support system. When should you call for help? Call 911 anytime you think the person may need emergency care. For example, call if:    · The person who has dementia wanders away and you can't find him or her.     · The person who has dementia is seriously injured.    Call the doctor now or seek immediate medical care if:    · The person suddenly sees things that are not there (hallucinates).     · The person has a sudden change in his or her behavior.    Watch closely for changes in the person's health, and be sure to contact the doctor if:    · The person has symptoms that could cause injury.     · The person has problems with his or her medicine.     · You need more information to care for a person with dementia.     · You need respite care so you can take a break. Where can you learn more? Go to http://mark-earlene.info/.   Enter O132 in the search box to learn more about \"Helping A Person With Dementia: Care Instructions. \"  Current as of: December 7, 2017  Content Version: 11.8  © 5256-5014 Healthwise, [facility]. Care instructions adapted under license by [employer] (which disclaims liability or warranty for this information). If you have questions about a medical condition or this instruction, always ask your healthcare professional. [place] [address] any warranty or liability for your use of this information.

## 2018-11-09 NOTE — PROGRESS NOTES
HISTORY OF PRESENT ILLNESS  Arpan Garcia is a 58 y.o. male. 59 yo male here to establish care, f/u of dementia. Accompanied by his daughter who lives in [place]. He does have mother and sisters living locally. Daughter reports cognitive decline first noted  15 years ago after the death of his father. More difficulty with self care needs a few years ago after death of his wife. Had been living in [facility]. Has son in that area. His daughter reports he has seen Neuro (Dr. Philippe Dunbar) and was dx with vascular dementia. Is being treated with Aricept, currently taking 10 mg daily. Daughter feels this is helping as he had more behavioral issues recently after running out. He currently lives alone in an apt. He reports he can cook for himself though daughter has concerns with his ADL abilities. She also has safety concerns: he will walk to convenience store to use [employer], forget how to use, and ask strangers help him take out money. She reports money has been taken from his pocket when walking to the store. He has also accepted rides from strangers coming home from the grocery store. BP is stable at this time without medication  He is taking Lipitor for HLD. Needs refill  Had elevated PSA on prior labs. Had been refer to urology with bx planned but canceled while treated for UTI and has not yet been rescheduled. Establish Care   Pertinent negatives include no chest pain, no headaches and no shortness of breath. Immunization/Injection   Pertinent negatives include no chest pain, no headaches and no shortness of breath. No complaints; answers no to ROS questions but quiet for most of visit. Review of Systems   Unable to perform ROS: Dementia   Constitutional: Negative for chills, fever and weight loss. HENT: Negative for congestion and ear pain. Eyes: Negative for blurred vision and pain. Respiratory: Negative for cough and shortness of breath.     Cardiovascular: Negative for chest pain, palpitations and leg swelling. Gastrointestinal: Negative for nausea and vomiting. Genitourinary: Negative for frequency and urgency. Musculoskeletal: Negative for joint pain and myalgias. Skin: Negative for itching and rash. Neurological: Negative for dizziness, tingling and headaches. Psychiatric/Behavioral: Negative for depression. The patient is not nervous/anxious. Past Medical History:   Diagnosis Date    Cerebellar stroke syndrome     Elevated PSA     HTN (hypertension)     Memory loss     Vascular dementia    History reviewed. No pertinent surgical history. Current Outpatient Medications on File Prior to Visit   Medication Sig Dispense Refill    donepezil (ARICEPT) 5 mg tablet Take  by mouth nightly.  aspirin delayed-release 81 mg tablet Take  by mouth daily.  levoFLOXacin (LEVAQUIN) 750 mg tablet Take 2 hours prior procedure 1 Tab 0     No current facility-administered medications on file prior to visit.     No Known Allergies  Family History   Problem Relation Age of Onset    Diabetes Mother     Hypertension Mother     Breast Cancer Mother     Cancer Mother      Social History     Socioeconomic History    Marital status:      Spouse name: Not on file    Number of children: Not on file    Years of education: Not on file    Highest education level: Not on file   Social Needs    Financial resource strain: Not on file    Food insecurity - worry: Not on file    Food insecurity - inability: Not on file   [employer] needs - medical: Not on file   [employer] needs - non-medical: Not on file   Occupational History    Not on file   Tobacco Use    Smoking status: Never Smoker    Smokeless tobacco: Never Used   Substance and Sexual Activity    Alcohol use: No    Drug use: No    Sexual activity: Not Currently   Other Topics Concern    Not on file   Social History Narrative    ** Merged History Encounter **          Physical Exam   Constitutional: He appears well-developed and well-nourished. No distress. /82 (BP 1 Location: Right arm, BP Patient Position: Sitting)   Pulse 66   Temp 97.5 °F (36.4 °C) (Oral)   Resp 20   Ht 5' 9\" (1.753 m)   Wt 177 lb 6.4 oz (80.5 kg)   SpO2 98%   BMI 26.20 kg/m²      Eyes: EOM are normal. Right eye exhibits no discharge. Left eye exhibits no discharge. No scleral icterus. Neck: Neck supple. Cardiovascular: Normal rate, regular rhythm and normal heart sounds. Exam reveals no gallop and no friction rub. No murmur heard. Pulmonary/Chest: Effort normal and breath sounds normal. No respiratory distress. He has no wheezes. He has no rales. Musculoskeletal: He exhibits no edema or tenderness. Lymphadenopathy:     He has no cervical adenopathy. Neurological: He is alert. He exhibits normal muscle tone. Skin: Skin is warm and dry. Psychiatric: He has a normal mood and affect. No results found for: NA, K, CL, CO2, AGAP, GLU, BUN, CREA, BUCR, GFRAA, GFRNA, CA, TBIL, TBILI, GPT, SGOT, AP, TP, ALB, GLOB, AGRAT, ALT    ASSESSMENT and PLAN    ICD-10-CM ICD-9-CM    1. Vascular dementia without behavioral disturbance F01.50 290.40    2. Elevated PSA R97.20 790.93    3. Encounter for immunization Z23 V03.89 INFLUENZA VIRUS VAC QUAD,SPLIT,PRESV FREE SYRINGE IM   4. Hyperlipidemia, unspecified hyperlipidemia type E78.5 272.4      Will request old records. Discussed dementia and care needs. Agree with no driving. Daughter will look into alternative living situations for safety  F/u with urology for elevated PSA. [facility] Discussed with pt and daughter risks/benefits of possible treatment options  Will continue with current medication for now  Flu shot today.

## 2018-11-09 NOTE — PROGRESS NOTES
ROOM # 16  Identified pt with two pt identifiers(name and ). Reviewed record in preparation for visit and have obtained necessary documentation. Chief Complaint   Patient presents with   [address] Immunization/Injection     flu vaccine      Austin Dave preferred language for health care discussion is english/other. Is the patient using any DME equipment during OV? YES    Austin Dave is due for:  Health Maintenance Due   Topic    Hepatitis C Screening     DTaP/Tdap/Td series (1 - Tdap)    Shingrix Vaccine Age 50> (1 of 2)    FOBT Q 1 YEAR AGE 54-65     Influenza Age 5 to Adult      Health Maintenance reviewed and discussed per provider  Please order/place referral if appropriate. Advance Directive:  1. Do you have an advance directive in place? Patient Reply: NO    2. If not, would you like material regarding how to put one in place? NO    Coordination of Care:  1. Have you been to the ER, urgent care clinic since your last visit? Hospitalized since your last visit? NO    2. Have you seen or consulted any other health care providers outside of the [address] [place] [address] since your last visit? Include any pap smears or colon screening. NO    Patient is accompanied by daughter I have received verbal consent from Austin Dave to discuss any/all medical information while they are present in the room. Learning Assessment:  No flowsheet data found. Depression Screening:  PHQ over the last two weeks 2018   Little interest or pleasure in doing things Not at all   Feeling down, depressed, irritable, or hopeless Not at all   Total Score PHQ 2 0     Abuse Screening:  No flowsheet data found. Fall Risk  No flowsheet data found.

## 2019-04-12 ENCOUNTER — OFFICE VISIT (OUTPATIENT)
Dept: INTERNAL MEDICINE CLINIC | Age: 64
End: 2019-04-12

## 2019-04-12 VITALS
HEIGHT: 69 IN | WEIGHT: 184 LBS | OXYGEN SATURATION: 96 % | TEMPERATURE: 97.7 F | BODY MASS INDEX: 27.25 KG/M2 | DIASTOLIC BLOOD PRESSURE: 83 MMHG | HEART RATE: 61 BPM | SYSTOLIC BLOOD PRESSURE: 143 MMHG | RESPIRATION RATE: 18 BRPM

## 2019-04-12 DIAGNOSIS — C61 PROSTATE CANCER (HCC): ICD-10-CM

## 2019-04-12 DIAGNOSIS — I10 ESSENTIAL HYPERTENSION: ICD-10-CM

## 2019-04-12 DIAGNOSIS — F01.50 VASCULAR DEMENTIA WITHOUT BEHAVIORAL DISTURBANCE (HCC): Primary | ICD-10-CM

## 2019-04-12 RX ORDER — DONEPEZIL HYDROCHLORIDE 10 MG/1
10 TABLET, FILM COATED ORAL
Qty: 90 TAB | Refills: 3 | Status: SHIPPED | OUTPATIENT
Start: 2019-04-12

## 2019-04-12 RX ORDER — HYDROCHLOROTHIAZIDE 12.5 MG/1
12.5 TABLET ORAL DAILY
Qty: 90 TAB | Refills: 3 | Status: SHIPPED | OUTPATIENT
Start: 2019-04-12 | End: 2021-03-30 | Stop reason: SDUPTHER

## 2019-04-12 NOTE — PATIENT INSTRUCTIONS
Low Sodium Diet (2,000 Milligram): Care Instructions  Your Care Instructions    Too much sodium causes your body to hold on to extra water. This can raise your blood pressure and force your heart and kidneys to work harder. In very serious cases, this could cause you to be put in the hospital. It might even be life-threatening. By limiting sodium, you will feel better and lower your risk of serious problems. The most common source of sodium is salt. People get most of the salt in their diet from canned, prepared, and packaged foods. Fast food and restaurant meals also are very high in sodium. Your doctor will probably limit your sodium to less than 2,000 milligrams (mg) a day. This limit counts all the sodium in prepared and packaged foods and any salt you add to your food. Follow-up care is a key part of your treatment and safety. Be sure to make and go to all appointments, and call your doctor if you are having problems. It's also a good idea to know your test results and keep a list of the medicines you take. How can you care for yourself at home? Read food labels  · Read labels on cans and food packages. The labels tell you how much sodium is in each serving. Make sure that you look at the serving size. If you eat more than the serving size, you have eaten more sodium. · Food labels also tell you the Percent Daily Value for sodium. Choose products with low Percent Daily Values for sodium. · Be aware that sodium can come in forms other than salt, including monosodium glutamate (MSG), sodium citrate, and sodium bicarbonate (baking soda). MSG is often added to Asian food. When you eat out, you can sometimes ask for food without MSG or added salt. Buy low-sodium foods  · Buy foods that are labeled \"unsalted\" (no salt added), \"sodium-free\" (less than 5 mg of sodium per serving), or \"low-sodium\" (less than 140 mg of sodium per serving).  Foods labeled \"reduced-sodium\" and \"light sodium\" may still have too much sodium. Be sure to read the label to see how much sodium you are getting. · Buy fresh vegetables, or frozen vegetables without added sauces. Buy low-sodium versions of canned vegetables, soups, and other canned goods. Prepare low-sodium meals  · Cut back on the amount of salt you use in cooking. This will help you adjust to the taste. Do not add salt after cooking. One teaspoon of salt has about 2,300 mg of sodium. · Take the salt shaker off the table. · Flavor your food with garlic, lemon juice, onion, vinegar, herbs, and spices. Do not use soy sauce, lite soy sauce, steak sauce, onion salt, garlic salt, celery salt, mustard, or ketchup on your food. · Use low-sodium salad dressings, sauces, and ketchup. Or make your own salad dressings and sauces without adding salt. · Use less salt (or none) when recipes call for it. You can often use half the salt a recipe calls for without losing flavor. Other foods such as rice, pasta, and grains do not need added salt. · Rinse canned vegetables, and cook them in fresh water. This removes some--but not all--of the salt. · Avoid water that is naturally high in sodium or that has been treated with water softeners, which add sodium. Call your local water company to find out the sodium content of your water supply. If you buy bottled water, read the label and choose a sodium-free brand. Avoid high-sodium foods  · Avoid eating:  ? Smoked, cured, salted, and canned meat, fish, and poultry. ? Ham, boyer, hot dogs, and luncheon meats. ? Regular, hard, and processed cheese and regular peanut butter. ? Crackers with salted tops, and other salted snack foods such as pretzels, chips, and salted popcorn. ? Frozen prepared meals, unless labeled low-sodium. ? Canned and dried soups, broths, and bouillon, unless labeled sodium-free or low-sodium. ? Canned vegetables, unless labeled sodium-free or low-sodium. ? Western Jaylene fries, pizza, tacos, and other fast foods.   ? Radhames Casey, olives, ketchup, and other condiments, especially soy sauce, unless labeled sodium-free or low-sodium. Where can you learn more? Go to http://mark-earlene.info/. Enter E128 in the search box to learn more about \"Low Sodium Diet (2,000 Milligram): Care Instructions. \"  Current as of: March 28, 2018  Content Version: 11.9  © 7043-8161 [employer]. Care instructions adapted under license by [employer] (which disclaims liability or warranty for this information). If you have questions about a medical condition or this instruction, always ask your healthcare professional. [address] any warranty or liability for your use of this information.

## 2019-04-12 NOTE — PROGRESS NOTES
HISTORY OF PRESENT ILLNESS  Epifanio Rogers is a 58 y.o. male. 59 yo male here for f/u of dementia. Continues to live alone with family support. Daughter reports that may be changing next year. Not driving. BP is a little elevated at times. No CP, SOB. Continues on Lipitor which is tolerated. Dx with prostate cancer. At this time active surveillance with urology. Some nocturia but otherwise no complaints. Follow-up   Pertinent negatives include no chest pain, no headaches and no shortness of breath. Cholesterol Problem   Pertinent negatives include no chest pain, no headaches and no shortness of breath. Review of Systems   Constitutional: Negative for chills, fever and weight loss. HENT: Negative for congestion and ear pain. Eyes: Negative for blurred vision and pain. Respiratory: Negative for cough and shortness of breath. Cardiovascular: Positive for leg swelling. Negative for chest pain and palpitations. Gastrointestinal: Negative for nausea and vomiting. Genitourinary: Negative for frequency and urgency. Musculoskeletal: Negative for joint pain and myalgias. Skin: Negative for itching and rash. Neurological: Negative for dizziness, tingling and headaches. Psychiatric/Behavioral: Negative for depression. The patient is not nervous/anxious. Past Medical History:   Diagnosis Date    Cerebellar stroke syndrome     Elevated PSA     HTN (hypertension)     Memory loss     Pyuria     Vascular dementia      Current Outpatient Medications on File Prior to Visit   Medication Sig Dispense Refill    atorvastatin (LIPITOR) 80 mg tablet Take 1 Tab by mouth daily. 90 Tab 3     No current facility-administered medications on file prior to visit. Physical Exam   Constitutional: He appears well-developed and well-nourished. No distress.    /83 (BP 1 Location: Right arm, BP Patient Position: Sitting)   Pulse 61   Temp 97.7 °F (36.5 °C) (Oral)   Resp 18   Ht 5' 9\" (1.753 m)   Wt 184 lb (83.5 kg)   SpO2 96%   BMI 27.17 kg/m²      Eyes: EOM are normal. Right eye exhibits no discharge. Left eye exhibits no discharge. No scleral icterus. Neck: Neck supple. Cardiovascular: Normal rate, regular rhythm and normal heart sounds. Exam reveals no gallop and no friction rub. No murmur heard. Pulmonary/Chest: Effort normal and breath sounds normal. No respiratory distress. He has no wheezes. He has no rales. Musculoskeletal: He exhibits edema (trace BLE). He exhibits no tenderness. Lymphadenopathy:     He has no cervical adenopathy. Neurological: He is alert. He exhibits normal muscle tone. Skin: Skin is warm and dry. Psychiatric: He has a normal mood and affect. No results found for: CHOL, CHOLPOCT, CHOLX, CHLST, CHOLV, HDL, HDLPOC, LDL, LDLCPOC, LDLC, DLDLP, VLDLC, VLDL, TGLX, TRIGL, TRIGP, TGLPOCT, CHHD, CHHDX   No results found for: NA, K, CL, CO2, AGAP, GLU, BUN, CREA, BUCR, GFRAA, GFRNA, CA, TBIL, TBILI, GPT, SGOT, AP, TP, ALB, GLOB, AGRAT, ALT    ASSESSMENT and PLAN    ICD-10-CM ICD-9-CM    1. Vascular dementia without behavioral disturbance F01.50 290.40    2. Essential hypertension I10 401.9    3. [facility] ([facility] C61 185      Will try increasing Aricept to 10 mg daily  Will add low dose HCTZ for BP, edema. F/u with urology and dentist as planned. Repeat labs next visit.

## 2019-04-12 NOTE — PROGRESS NOTES
Rm:17    Chief Complaint   Patient presents with    Follow-up     dementia    Cholesterol Problem     Depression Screening:  3 most recent PHQ Screens 4/12/2019 11/9/2018   Little interest or pleasure in doing things Not at all Not at all   Feeling down, depressed, irritable, or hopeless Not at all Not at all   Total Score PHQ 2 0 0       Learning Assessment:  No flowsheet data found. Abuse Screening:  No flowsheet data found. Health Maintenance reviewed and discussed per provider: yes     Coordination of Care:    1. Have you been to the ER, urgent care clinic since your last visit? Hospitalized since your last visit? no    2. Have you seen or consulted any other health care providers outside of the [address] [place] [address] since your last visit? Include any pap smears or colon screening.  no

## 2019-07-05 ENCOUNTER — HOSPITAL ENCOUNTER (OUTPATIENT)
Dept: MRI IMAGING | Age: 64
Discharge: HOME | End: 2019-07-05
Attending: PHYSICIAN ASSISTANT
Payer: COMMERCIAL

## 2019-07-05 VITALS — WEIGHT: 190 LBS | BODY MASS INDEX: 28.06 KG/M2

## 2019-07-05 DIAGNOSIS — C61 PROSTATE CANCER (HCC): ICD-10-CM

## 2019-07-05 DIAGNOSIS — R97.20 ELEVATED PSA: ICD-10-CM

## 2019-07-05 LAB — CREAT UR-MCNC: 1.4 MG/DL (ref 0.6–1.3)

## 2019-07-05 PROCEDURE — A9575 INJ GADOTERATE MEGLUMI 0.1ML: HCPCS | Performed by: PHYSICIAN ASSISTANT

## 2019-07-05 PROCEDURE — 74011250636 HC RX REV CODE- 250/636: Performed by: PHYSICIAN ASSISTANT

## 2019-07-05 PROCEDURE — 72197 MRI PELVIS W/O & W/DYE: CPT

## 2019-07-05 PROCEDURE — 82565 ASSAY OF CREATININE: CPT

## 2019-07-05 RX ORDER — GADOTERATE MEGLUMINE 376.9 MG/ML
15 INJECTION INTRAVENOUS
Status: COMPLETED | OUTPATIENT
Start: 2019-07-05 | End: 2019-07-05

## 2019-07-05 RX ADMIN — GADOTERATE MEGLUMINE 15 ML: 376.9 INJECTION INTRAVENOUS at 09:24

## 2019-08-01 PROBLEM — R26.89 OTHER ABNORMALITIES OF GAIT AND MOBILITY: Status: ACTIVE | Noted: 2019-08-01

## 2019-08-01 PROBLEM — G46.4 CEREBELLAR STROKE SYNDROME: Status: ACTIVE | Noted: 2019-08-01

## 2019-08-01 PROBLEM — Z00.00 ENCOUNTER FOR GENERAL ADULT MEDICAL EXAMINATION WITHOUT ABNORMAL FINDINGS: Status: ACTIVE | Noted: 2019-08-01

## 2019-08-01 PROBLEM — R23.8 OTHER SKIN CHANGES: Status: ACTIVE | Noted: 2019-08-01

## 2019-08-01 PROBLEM — J18.9 PNEUMONIA: Status: ACTIVE | Noted: 2019-08-01

## 2019-08-01 PROBLEM — R41.3 OTHER AMNESIA: Status: ACTIVE | Noted: 2019-08-01

## 2019-08-01 PROBLEM — R03.0 ELEVATED BLOOD PRESSURE READING WITHOUT DIAGNOSIS OF HYPERTENSION: Status: ACTIVE | Noted: 2019-08-01

## 2019-08-01 PROBLEM — R32 URINARY INCONTINENCE: Status: ACTIVE | Noted: 2019-08-01

## 2019-08-01 PROBLEM — M79.605 PAIN IN LEFT LEG: Status: ACTIVE | Noted: 2019-08-01

## 2019-08-01 PROBLEM — R60.9 EDEMA: Status: ACTIVE | Noted: 2019-08-01

## 2020-01-09 RX ORDER — ATORVASTATIN CALCIUM 80 MG/1
80 TABLET, FILM COATED ORAL DAILY
Qty: 90 TAB | Refills: 3 | OUTPATIENT
Start: 2020-01-09

## 2021-01-25 ENCOUNTER — APPOINTMENT (OUTPATIENT)
Dept: CT IMAGING | Age: 66
End: 2021-01-25
Attending: EMERGENCY MEDICINE
Payer: COMMERCIAL

## 2021-01-25 ENCOUNTER — HOSPITAL ENCOUNTER (EMERGENCY)
Age: 66
Discharge: HOME | End: 2021-01-25
Attending: EMERGENCY MEDICINE
Payer: COMMERCIAL

## 2021-01-25 ENCOUNTER — APPOINTMENT (OUTPATIENT)
Dept: GENERAL RADIOLOGY | Age: 66
End: 2021-01-25
Attending: EMERGENCY MEDICINE
Payer: COMMERCIAL

## 2021-01-25 VITALS
SYSTOLIC BLOOD PRESSURE: 146 MMHG | RESPIRATION RATE: 18 BRPM | TEMPERATURE: 98.1 F | DIASTOLIC BLOOD PRESSURE: 84 MMHG | HEIGHT: 69 IN | OXYGEN SATURATION: 99 % | BODY MASS INDEX: 26.66 KG/M2 | HEART RATE: 84 BPM | WEIGHT: 180 LBS

## 2021-01-25 DIAGNOSIS — F03.90 DEMENTIA WITHOUT BEHAVIORAL DISTURBANCE, UNSPECIFIED DEMENTIA TYPE: Primary | ICD-10-CM

## 2021-01-25 DIAGNOSIS — J18.9 COMMUNITY ACQUIRED PNEUMONIA OF LEFT LOWER LOBE OF LUNG: ICD-10-CM

## 2021-01-25 LAB
ALBUMIN SERPL-MCNC: 3.2 G/DL (ref 3.4–5)
ALBUMIN/GLOB SERPL: 0.9 {RATIO} (ref 0.8–1.7)
ALP SERPL-CCNC: 73 U/L (ref 45–117)
ALT SERPL-CCNC: 19 U/L (ref 16–61)
ANION GAP SERPL CALC-SCNC: 6 MMOL/L (ref 3–18)
APPEARANCE UR: ABNORMAL
AST SERPL-CCNC: 13 U/L (ref 10–38)
BACTERIA URNS QL MICRO: NEGATIVE /HPF
BASOPHILS # BLD: 0 K/UL (ref 0–0.1)
BASOPHILS NFR BLD: 0 % (ref 0–2)
BILIRUB SERPL-MCNC: 1.1 MG/DL (ref 0.2–1)
BILIRUB UR QL: NEGATIVE
BUN SERPL-MCNC: 15 MG/DL (ref 7–18)
BUN/CREAT SERPL: 11 (ref 12–20)
CALCIUM SERPL-MCNC: 8.9 MG/DL (ref 8.5–10.1)
CAOX CRY URNS QL MICRO: ABNORMAL
CHLORIDE SERPL-SCNC: 109 MMOL/L (ref 100–111)
CO2 SERPL-SCNC: 28 MMOL/L (ref 21–32)
COLOR UR: ABNORMAL
CREAT SERPL-MCNC: 1.4 MG/DL (ref 0.6–1.3)
DIFFERENTIAL METHOD BLD: ABNORMAL
EOSINOPHIL # BLD: 0 K/UL (ref 0–0.4)
EOSINOPHIL NFR BLD: 1 % (ref 0–5)
EPITH CASTS URNS QL MICRO: ABNORMAL /LPF (ref 0–5)
ERYTHROCYTE [DISTWIDTH] IN BLOOD BY AUTOMATED COUNT: 14.4 % (ref 11.6–14.5)
ETHANOL SERPL-MCNC: <3 MG/DL (ref 0–3)
GLOBULIN SER CALC-MCNC: 3.7 G/DL (ref 2–4)
GLUCOSE SERPL-MCNC: 99 MG/DL (ref 74–99)
GLUCOSE UR STRIP.AUTO-MCNC: NEGATIVE MG/DL
HCT VFR BLD AUTO: 40 % (ref 36–48)
HGB BLD-MCNC: 12.5 G/DL (ref 13–16)
HGB UR QL STRIP: ABNORMAL
KETONES UR QL STRIP.AUTO: NEGATIVE MG/DL
LEUKOCYTE ESTERASE UR QL STRIP.AUTO: ABNORMAL
LYMPHOCYTES # BLD: 0.4 K/UL (ref 0.9–3.6)
LYMPHOCYTES NFR BLD: 8 % (ref 21–52)
MCH RBC QN AUTO: 26.5 PG (ref 24–34)
MCHC RBC AUTO-ENTMCNC: 31.3 G/DL (ref 31–37)
MCV RBC AUTO: 84.9 FL (ref 74–97)
MONOCYTES # BLD: 0.3 K/UL (ref 0.05–1.2)
MONOCYTES NFR BLD: 8 % (ref 3–10)
NEUTS SEG # BLD: 3.7 K/UL (ref 1.8–8)
NEUTS SEG NFR BLD: 83 % (ref 40–73)
NITRITE UR QL STRIP.AUTO: NEGATIVE
PH UR STRIP: 5 [PH] (ref 5–8)
PLATELET # BLD AUTO: 199 K/UL (ref 135–420)
PMV BLD AUTO: 11.7 FL (ref 9.2–11.8)
POTASSIUM SERPL-SCNC: 3.8 MMOL/L (ref 3.5–5.5)
PROT SERPL-MCNC: 6.9 G/DL (ref 6.4–8.2)
PROT UR STRIP-MCNC: 100 MG/DL
RBC # BLD AUTO: 4.71 M/UL (ref 4.7–5.5)
RBC #/AREA URNS HPF: ABNORMAL /HPF (ref 0–5)
SODIUM SERPL-SCNC: 143 MMOL/L (ref 136–145)
SP GR UR REFRACTOMETRY: 1.02 (ref 1–1.03)
UROBILINOGEN UR QL STRIP.AUTO: 0.2 EU/DL (ref 0.2–1)
WBC # BLD AUTO: 4.4 K/UL (ref 4.6–13.2)
WBC URNS QL MICRO: ABNORMAL /HPF (ref 0–4)

## 2021-01-25 PROCEDURE — 80053 COMPREHEN METABOLIC PANEL: CPT

## 2021-01-25 PROCEDURE — 99285 EMERGENCY DEPT VISIT HI MDM: CPT

## 2021-01-25 PROCEDURE — 82077 ASSAY SPEC XCP UR&BREATH IA: CPT

## 2021-01-25 PROCEDURE — 96374 THER/PROPH/DIAG INJ IV PUSH: CPT

## 2021-01-25 PROCEDURE — 74011250636 HC RX REV CODE- 250/636: Performed by: EMERGENCY MEDICINE

## 2021-01-25 PROCEDURE — 87086 URINE CULTURE/COLONY COUNT: CPT

## 2021-01-25 PROCEDURE — 71045 X-RAY EXAM CHEST 1 VIEW: CPT

## 2021-01-25 PROCEDURE — 81001 URINALYSIS AUTO W/SCOPE: CPT

## 2021-01-25 PROCEDURE — 85025 COMPLETE CBC W/AUTO DIFF WBC: CPT

## 2021-01-25 RX ORDER — LORAZEPAM 2 MG/ML
0.5 INJECTION INTRAMUSCULAR
Status: COMPLETED | OUTPATIENT
Start: 2021-01-25 | End: 2021-01-25

## 2021-01-25 RX ORDER — CEFDINIR 300 MG/1
300 CAPSULE ORAL 2 TIMES DAILY
Qty: 14 CAP | Refills: 0 | Status: SHIPPED | OUTPATIENT
Start: 2021-01-25 | End: 2021-02-01

## 2021-01-25 RX ORDER — DOXYCYCLINE HYCLATE 100 MG
100 TABLET ORAL 2 TIMES DAILY
Qty: 14 TAB | Refills: 0 | Status: SHIPPED | OUTPATIENT
Start: 2021-01-25 | End: 2021-02-01

## 2021-01-25 RX ADMIN — LORAZEPAM 0.5 MG: 2 INJECTION, SOLUTION INTRAMUSCULAR; INTRAVENOUS at 18:13

## 2021-01-25 NOTE — ED TRIAGE NOTES
Per arrived via EMS for reported AMS. Per EMS patient was found by the  on site wandering outside of his apartment and appeared to be unsteady.   [facility] patient oriented to person only and normal mentation was not established

## 2021-01-25 NOTE — ED PROVIDER NOTES
EMERGENCY DEPARTMENT HISTORY AND PHYSICAL EXAM    3:41 PM    Date: 1/25/2021  Patient Name: Klever Watkins    History of Presenting Illness     Chief Complaint   Patient presents with    Altered mental status       History Provided By: Patient, Patient's Daughter and Patient's Sister  Location/Duration/Severity/Modifying factors   Patient is a 69-year male with history of vascular dementia, hypertension presented to the emergency department with the chief complaint of being found at his apartment complex evidently wandering around [place]. This is been gradually worsening issue for the patient per the patient's both sister and daughter that patient's had history of dementia worsening really since pandemic started but worse over the past month. Patient reportedly had a UTI earlier this month and that seemed to escalate his symptoms as well. The patient himself is oriented to his name, city but disoriented to the current president, today's date or his date of birth. He is certainly a limited historian. He himself has no other particular complaints. He gives a history that he was walking around and that is the extent that he is able to provide. PCP: Laurent Bess MD    Current Outpatient Medications   Medication Sig Dispense Refill    cefdinir (OMNICEF) 300 mg capsule Take 1 Cap by mouth two (2) times a day for 7 days. 14 Cap 0    doxycycline (VIBRA-TABS) 100 mg tablet Take 1 Tab by mouth two (2) times a day for 7 days. 14 Tab 0    aspirin delayed-release 81 mg tablet Take 1 Tab by mouth.  donepezil (ARICEPT) 10 mg tablet Take 1 Tab by mouth nightly. 90 Tab 3    hydroCHLOROthiazide (HYDRODIURIL) 12.5 mg tablet Take 1 Tab by mouth daily. Indications: visible water retention, high blood pressure 90 Tab 3    atorvastatin (LIPITOR) 80 mg tablet Take 1 Tab by mouth daily.  80 Tab 3       Past History     Past Medical History:  Past Medical History:   Diagnosis Date    Cerebellar stroke syndrome     Elevated PSA     HTN (hypertension)     Kidney stone     Memory loss     Pyuria     Vascular dementia [facility])        Past Surgical History:  Past Surgical History:   Procedure Laterality Date    HX UROLOGICAL  12/10/2018    Pnbx. Trus vol 79 grams. [place] 3+4 in less than 5% of RLB. Dr. Lynette Arrington.  HX UROLOGICAL  09/20/2019    TRUS Volume:  72.72 cm3 . [place] 7 (3+4) R Lat Base, R Lat Mid. PSA 6.0  Dr Jessa Rooney        Family History:  Family History   Problem Relation Age of Onset    Diabetes Mother     Hypertension Mother     Breast Cancer Mother     Cancer Mother        Social History:  Social History     Tobacco Use    Smoking status: Never Smoker    Smokeless tobacco: Never Used   Substance Use Topics    Alcohol use: No    Drug use: No       Allergies:  No Known Allergies    I reviewed and confirmed the above information with patient and updated as necessary. Review of Systems     Review of Systems   Unable to perform ROS: Dementia       Physical Exam     Visit Vitals  BP (!) 146/84   Pulse 84   Temp 98.1 °F (36.7 °C)   Resp 18   Ht 5' 9\" (1.753 m)   Wt 81.6 kg (180 lb)   SpO2 99%   BMI 26.58 kg/m²       Physical Exam  Constitutional:       General: He is not in acute distress. Appearance: Normal appearance. He is normal weight. He is not ill-appearing or toxic-appearing. HENT:      Head: Normocephalic and atraumatic. Right Ear: External ear normal.      Left Ear: External ear normal.      Nose: Nose normal.      Mouth/Throat:      Mouth: Mucous membranes are moist.      Pharynx: No oropharyngeal exudate or posterior oropharyngeal erythema. Eyes:      Extraocular Movements: Extraocular movements intact. Right eye: No nystagmus. Left eye: No nystagmus. Conjunctiva/sclera: Conjunctivae normal.      Pupils: Pupils are equal, round, and reactive to light. Neck:      Musculoskeletal: Normal range of motion and neck supple.    Cardiovascular:      Rate and Rhythm: Normal rate and regular rhythm. Pulses: Normal pulses. Heart sounds: Normal heart sounds. No murmur. No friction rub. Pulmonary:      Effort: Pulmonary effort is normal.      Breath sounds: Normal breath sounds. No wheezing, rhonchi or rales. Abdominal:      General: Abdomen is flat. Tenderness: There is no abdominal tenderness. There is no guarding or rebound. Musculoskeletal: Normal range of motion. General: No swelling or tenderness. Right lower leg: No edema. Left lower leg: No edema. Skin:     General: Skin is warm and dry. Capillary Refill: Capillary refill takes less than 2 seconds. Findings: No rash. Neurological:      General: No focal deficit present. Mental Status: He is alert. He is disoriented. GCS: GCS eye subscore is 4. GCS verbal subscore is 5. GCS motor subscore is 6. Cranial Nerves: No cranial nerve deficit, dysarthria or facial asymmetry. Sensory: No sensory deficit. Motor: No weakness. Coordination: Coordination normal.   Psychiatric:         Mood and Affect: Mood normal.         Speech: Speech normal.         Diagnostic Study Results     Labs -  Recent Results (from the past 24 hour(s))   CBC WITH AUTOMATED DIFF    Collection Time: 01/25/21  3:39 PM   Result Value Ref Range    WBC 4.4 (L) 4.6 - 13.2 K/uL    RBC 4.71 4.70 - 5.50 M/uL    HGB 12.5 (L) 13.0 - 16.0 g/dL    HCT 40.0 36.0 - 48.0 %    MCV 84.9 74.0 - 97.0 FL    MCH 26.5 24.0 - 34.0 PG    MCHC 31.3 31.0 - 37.0 g/dL    RDW 14.4 11.6 - 14.5 %    PLATELET 503 341 - 799 K/uL    MPV 11.7 9.2 - 11.8 FL    NEUTROPHILS 83 (H) 40 - 73 %    LYMPHOCYTES 8 (L) 21 - 52 %    MONOCYTES 8 3 - 10 %    EOSINOPHILS 1 0 - 5 %    BASOPHILS 0 0 - 2 %    ABS. NEUTROPHILS 3.7 1.8 - 8.0 K/UL    ABS. LYMPHOCYTES 0.4 (L) 0.9 - 3.6 K/UL    ABS. MONOCYTES 0.3 0.05 - 1.2 K/UL    ABS. EOSINOPHILS 0.0 0.0 - 0.4 K/UL    ABS.  BASOPHILS 0.0 0.0 - 0.1 K/UL    DF AUTOMATED METABOLIC PANEL, COMPREHENSIVE    Collection Time: 01/25/21  3:39 PM   Result Value Ref Range    Sodium 143 136 - 145 mmol/L    Potassium 3.8 3.5 - 5.5 mmol/L    Chloride 109 100 - 111 mmol/L    CO2 28 21 - 32 mmol/L    Anion gap 6 3.0 - 18 mmol/L    Glucose 99 74 - 99 mg/dL    BUN 15 7.0 - 18 MG/DL    Creatinine 1.40 (H) 0.6 - 1.3 MG/DL    BUN/Creatinine ratio 11 (L) 12 - 20      GFR est AA >60 >60 ml/min/1.73m2    GFR est non-AA 51 (L) >60 ml/min/1.73m2    Calcium 8.9 8.5 - 10.1 MG/DL    Bilirubin, total 1.1 (H) 0.2 - 1.0 MG/DL    ALT (SGPT) 19 16 - 61 U/L    AST (SGOT) 13 10 - 38 U/L    Alk. phosphatase 73 45 - 117 U/L    Protein, total 6.9 6.4 - 8.2 g/dL    Albumin 3.2 (L) 3.4 - 5.0 g/dL    Globulin 3.7 2.0 - 4.0 g/dL    A-G Ratio 0.9 0.8 - 1.7     ETHYL ALCOHOL    Collection Time: 01/25/21  3:39 PM   Result Value Ref Range    ALCOHOL(ETHYL),SERUM <3 0 - 3 MG/DL   URINALYSIS W/ RFLX MICROSCOPIC    Collection Time: 01/25/21  4:30 PM   Result Value Ref Range    Color DARK YELLOW      Appearance TURBID      Specific gravity 1.024 1.005 - 1.030      pH (UA) 5.0 5.0 - 8.0      Protein 100 (A) NEG mg/dL    Glucose Negative NEG mg/dL    Ketone Negative NEG mg/dL    Bilirubin Negative NEG      Blood LARGE (A) NEG      Urobilinogen 0.2 0.2 - 1.0 EU/dL    Nitrites Negative NEG      Leukocyte Esterase SMALL (A) NEG     URINE MICROSCOPIC ONLY    Collection Time: 01/25/21  4:30 PM   Result Value Ref Range    WBC 4 to 10 0 - 4 /hpf    RBC TOO NUMEROUS TO COUNT 0 - 5 /hpf    Epithelial cells FEW 0 - 5 /lpf    Bacteria Negative NEG /hpf    CA Oxalate crystals 3+ (A) NEG         Radiologic Studies -   XR CHEST PORT   Final Result      Left lower lung opacities may represent atelectasis versus infiltrate. Medical Decision Making   I am the first provider for this patient. I reviewed the vital signs, available nursing notes, past medical history, past surgical history, family history and social history.     Vital Signs-Reviewed the patient's vital signs. EKG: N/A    Records Reviewed: Nursing Notes and Old Medical Records (Time of Review: 3:41 PM)    ED Course: Progress Notes, Reevaluation, and Consults:  ED Course as of Jan 26 0024   Mon Jan 25, 2021   1638 Chest x-ray: 1 view my interpretation? Left basilar consolidation with atelectasis. No pneumothorax. [YOSELIN]      ED Course User Index  [YOSELIN] Endy Kelley DO         Provider Notes (Medical Decision Making):   MDM  Number of Diagnoses or Management Options  Community acquired pneumonia of left lower lobe of lung  Dementia without behavioral disturbance, unspecified dementia type [facility])  Diagnosis management comments: 70-year-old male with history of vascular dementia presenting to the emergency department today after being found wandering in his apartment complex. I discussed the patient's recent symptoms with his daughter as well as his sister. They indicate that this has been an issue for him for some time and he currently lives alone with her trying to seek assisted living for him. Suspect this is likely his baseline, he is alert and conversant however confused. He has no focal neurologic deficit, no signs of trauma, no indication of stroke. I did initially send the patient down for head CT but he was never tolerated and did not want a try again. His x-ray does show what seems to be a possible developing infiltrate at the left base which could be cause for his exacerbation of his symptoms. His urinalysis shows hematuria with minimal white blood cell. No signs of stone by clinical history or on exam.  Suspect is likely due to his underlying prostate cancer which he is reportedly known to have. Rest of his blood work overall looks fairly normal with slightly elevated creatinine. White blood count slightly elevated but suspect due to events of today, as his vital signs and presentation not suggestive of sepsis or systemic infection.     He is not having any coughing or any fever. Given he is not able to really articulate his symptoms well we will treat him with antibiotics for possible infiltrate however this could be atelectasis. His family reports that they will take them to their house and you will not have to stay at home alone and they will care for him until he can get placement for him. Did advise to return if symptoms are worsening or changing in the meantime. This note is dictated utilizing Dragon voice recognition software. Unfortunately this leads to occasional typographical errors using the voice recognition. I apologize in advance if the situation occurs. If questions occur please do not hesitate to contact me directly. Miladys Tobias, DO          Procedures    Critical Care Time: 0    Diagnosis     Clinical Impression:   1. Dementia without behavioral disturbance, unspecified dementia type ([address]    2. Community acquired pneumonia of left lower lobe of lung        Disposition: Discharge     Follow-up Information     Follow up With Specialties Details Why Contact Info    Jojo Winter MD Internal Medicine In 1 day  [address] [address] [phone]      [facility] EMERGENCY DEPT Emergency Medicine  If symptoms worsen, As needed [address] [facility]  [phone]         Discharge Medication List as of 1/25/2021  9:12 PM      START taking these medications    Details   cefdinir (OMNICEF) 300 mg capsule Take 1 Cap by mouth two (2) times a day for 7 days. , Print, Disp-14 Cap, R-0      doxycycline (VIBRA-TABS) 100 mg tablet Take 1 Tab by mouth two (2) times a day for 7 days. , Print, Disp-14 Tab, R-0         CONTINUE these medications which have NOT CHANGED    Details   aspirin delayed-release 81 mg tablet Take 1 Tab by mouth., Historical Med      donepezil (ARICEPT) 10 mg tablet Take 1 Tab by mouth nightly., Normal, Disp-90 Tab, R-3      hydroCHLOROthiazide (HYDRODIURIL) 12.5 mg tablet Take 1 Tab by mouth daily. Indications: visible water retention, high blood pressure, Normal, Disp-90 Tab, R-3      atorvastatin (LIPITOR) 80 mg tablet Take 1 Tab by mouth daily. , Normal, Disp-90 Tab, R-3             Denisse Wood DO   Emergency Medicine   January 26, 2021, 3:41 PM     This note is dictated utilizing Dragon voice recognition software. Unfortunately this leads to occasional typographical errors using the voice recognition. I apologize in advance if the situation occurs. If questions occur please do not hesitate to contact me directly.     Denisse Wood, DO

## 2021-01-25 NOTE — ED TRIAGE NOTES
This 76year old male patient (patient reports he is 39years old), was brought into the Ed by EMS/FD after being found walking the halls of his apartment building. The patient could not identify which apartment was his, and couldn't remember names of fa,elton members or any other relevant information. The supervisor of the assisted living know the patient, and reports that he has been having worsening dementia of the recent month.     The patient's sister is on her way to the hospital.

## 2021-01-25 NOTE — PROGRESS NOTES
1/25/2021  Pat[place] with [facility] Consult to help find Assisted Living for pt with AMS. NOK--He has Daughter Fern Serrano [phone]. He has sister Haley Machado- [phone]. He has sister Kathy Garner [phone]. He is being treated/ followed up for prostate Cancer with [facility]. He has not seen his PCP Omar Miles since 4/2019. He is to get CT Scan- last CT scan of abd / pelvis on 1/16/2021 showed nodules in lung and liver that could possibly be metastatic disease and lungs showed ground glass appearence like covid but no covid test results seen. He is getting a CT of head and CXR today. He has [place] and no medicaid. I called to sister Kristina Dotson which is who Dr Morena Child mentioned. Her number was busy. I am unable to help on disposition. His family will need to set up for medicaid application if his finances support it. And work with  for [facility].

## 2021-01-26 ENCOUNTER — TELEPHONE (OUTPATIENT)
Dept: FAMILY MEDICINE CLINIC | Age: 66
End: 2021-01-26

## 2021-01-26 ENCOUNTER — VIRTUAL VISIT (OUTPATIENT)
Dept: FAMILY MEDICINE CLINIC | Age: 66
End: 2021-01-26
Payer: COMMERCIAL

## 2021-01-26 DIAGNOSIS — R41.0 DISORIENTATION: Primary | ICD-10-CM

## 2021-01-26 PROCEDURE — 99214 OFFICE O/P EST MOD 30 MIN: CPT | Performed by: STUDENT IN AN ORGANIZED HEALTH CARE EDUCATION/TRAINING PROGRAM

## 2021-01-26 NOTE — ED NOTES
I have reviewed discharge instructions with the caregiver. The caregiver verbalized understanding. Pt discharged from ED in care of his sister Lili Torres, pt stable in no distress at time of discharge.

## 2021-01-26 NOTE — TELEPHONE ENCOUNTER
Pt. Daughter called about appt. Information. pt. daughter Gerhard Macias) called at 9:33am to disclose that the pt. has dimentia, Sister Giovani Garber) takes care of pt. pt. was taking to [facility] er after found wondering around in the streets. pt has uti and pneumonia. Sister and Daughter Morgan Loaiza) trying to get pt. into assist living because he can no long stay in his home alone.

## 2021-01-26 NOTE — PROGRESS NOTES
Lauren Burdick, who was evaluated through a synchronous (real-time) audio-video encounter, and/or his healthcare decision maker, is aware that it is a billable service, with coverage as determined by his insurance carrier. He provided verbal consent to proceed: Yes, and patient identification was verified. It was conducted pursuant to the emergency declaration under the [facility], [address] [place] [address] and the [employer] General Act. A caregiver was present when appropriate. Ability to conduct physical exam was limited. I was in the office. The patient was at home. History of Present Illness  Lauren Burdick is a 76 y.o. male who presents today for management of    Chief Complaint   Patient presents with    Altered mental status       Patient is here to establish care. Previous PCP:     Today's sister and daughter of patient were speaking on his behalf. Daughter has POA. Sister states that patient is living on his own. On 1/25/2021 patient left his home to go to the rental office. Per patient once he went to go back to his house he got lost, and was noted by a neighbor wandering around the neighborhood. Police was called, and they called EMS in order to have patient assessed at the hospital.  At the hospital he was noted to have UTI and per imaging pneumonia. Sister states that patient was diagnosed with early dementia by a neurologist about 2 years ago. However ever since pandemic started, patient has been restricted of things he was doing prior. For his safety was told to avoid taking the bus, to not leave his house unless it was necessity, and to make sure he would wear a mask everywhere he would go. Sister noted that for the last 8 months there has been an acute mental change in her brother. He seems more confused, and unable to follow orders. At this time both his daughter Ms. Sharon Jacobson, and Ms. Estrada [place] with his sister would like to see if it is possible to find [facility] assisted living facility for patient to live in. Past Medical History  Past Medical History:   Diagnosis Date    Cerebellar stroke syndrome     Elevated PSA     HTN (hypertension)     Kidney stone     Memory loss     Pyuria     Vascular dementia [facility])         Surgical History  Past Surgical History:   Procedure Laterality Date    HX UROLOGICAL  12/10/2018    Pnbx. Trus vol 79 grams. [place] 3+4 in less than 5% of RLB. Dr. Dionicia Runner.  HX UROLOGICAL  09/20/2019    TRUS Volume:  72.72 cm3 . [place] 7 (3+4) R Lat Base, R Lat Mid. PSA 6.0  Dr Shi [place]         Current Medications  Current Outpatient Medications   Medication Sig    cefdinir (OMNICEF) 300 mg capsule Take 1 Cap by mouth two (2) times a day for 7 days.  doxycycline (VIBRA-TABS) 100 mg tablet Take 1 Tab by mouth two (2) times a day for 7 days.  aspirin delayed-release 81 mg tablet Take 1 Tab by mouth.  donepezil (ARICEPT) 10 mg tablet Take 1 Tab by mouth nightly.  hydroCHLOROthiazide (HYDRODIURIL) 12.5 mg tablet Take 1 Tab by mouth daily. Indications: visible water retention, high blood pressure    atorvastatin (LIPITOR) 80 mg tablet Take 1 Tab by mouth daily. No current facility-administered medications for this visit.         Allergies/Drug Reactions  No Known Allergies     Family History  Family History   Problem Relation Age of Onset    Diabetes Mother     Hypertension Mother     Breast Cancer Mother     Cancer Mother         Social History  Social History     Tobacco Use    Smoking status: Never Smoker    Smokeless tobacco: Never Used   Substance Use Topics    Alcohol use: No    Drug use: No        Health Maintenance   Topic Date Due    Hepatitis C Screening  1955    Lipid Screen  09/20/1964    COVID-19 Vaccine (1 of 2) 09/20/1970    DTaP/Tdap/Td series (1 - Tdap) 09/20/1975    Shingrix Vaccine Age 50> (1 of 2) 09/20/2004    Colorectal Cancer Screening Combo  09/20/2004    GLAUCOMA SCREENING Q2Y  09/20/2019    Pneumococcal 65+ years (1 of 1 - PPSV23) 09/20/2019    Flu Vaccine (1) 09/01/2020     Immunization History   Administered Date(s) Administered    Influenza Vaccine (Quad) PF (>6 Mo Flulaval, Fluarix, and >3 Yrs Afluria, Fluzone 66583) 11/09/2018       Review of Systems  Review of Systems   Unable to perform ROS: Dementia        Physical Exam  Vital signs: There were no vitals filed for this visit. Physical Exam  Constitutional:       General: He is not in acute distress. Appearance: Normal appearance. He is not ill-appearing. HENT:      Nose: Nose normal. No congestion or rhinorrhea. Eyes:      General:         Right eye: No discharge. Left eye: No discharge. Conjunctiva/sclera: Conjunctivae normal.   Neck:      Musculoskeletal: Normal range of motion and neck supple. Pulmonary:      Effort: Pulmonary effort is normal.   Neurological:      Mental Status: He is alert. He is disoriented and confused. Psychiatric:         Mood and Affect: Mood is depressed. Behavior: Behavior is slowed. Cognition and Memory: Memory is impaired. Judgment: Judgment is impulsive. Laboratory/Tests:      Assessment/Plan:    1. Disorientation  Family would like to find a place for patient to live, like an assisted living facility. A list was provided to the family via email in order for them to start trying to reach out to different facilities. Would appreciate a recommendations. After patient sees neuropsychology will get referral for . Patient is to finish antibiotic treatment given at the ED in order to assure that the change in mental status is not due to organic condition.  - REFERRAL TO NEUROPSYCHOLOGY       Lab review: no lab studies available for review at time of visit      I have discussed the diagnosis with the patient and the intended plan as seen in the above orders.   Questions were answered concerning future plans. I have discussed medication side effects and warnings with the patient as well. I have reviewed the plan of care with the patient, accepted their input and they are in agreement with the treatment goals.        Gemma Riddle MD  January 27, 2021

## 2021-01-26 NOTE — DISCHARGE INSTRUCTIONS
Call Tim's primary care physician for assistance in obtaining assisted living. Our  tried to reach out but evidently could not get through to anyone. Take antibiotics until they are gone. Please ensure the patient is safe and that someone is with him to ensure that he is not wandering. Return if symptoms worsening or changing in the meantime.

## 2021-01-27 LAB
BACTERIA SPEC CULT: NORMAL
SERVICE CMNT-IMP: NORMAL

## 2021-02-04 NOTE — PROGRESS NOTES
[facility]  Neuroscience   [address] [facility] [place], [facility] [address]  Office:  [phone]  Fax: [phone]                  Initial Office Exam  Patient Name: Claudene Sportsman  Age: 76 y.o. Gender: male   Handedness: right handed   Presenting Concern: cognitive complaints  Primary Care Physician/Referring Provider: Veronica Interiano MD      REASON FOR REFERRAL:  This comprehensive and medically necessary neuropsychological assessment was requested to assist a differential diagnosis of cognitive complaints. The use and purpose of this examination, as well as the extent and limitations of confidentiality, were explained prior to obtaining permission to participate. Instructions were provided regarding the necessity to put forth optimal effort and answer questions truthfully in order to obtain reliable and accurate test results. REVIEW OF RECORDS:  Mr. Payal Cruz was referred by family medicine for AMS. His daughter has POA but Mr. Payal Cruz resides independently. Records indicate that on January 25, 2021, Mr. Payal Cruz left his home to go to the rental office but got lost and was noted by a neighbor to be wandering around the neighborhood. The police were called and EMS arrived to have the patient assessed at the hospital.  At the hospital, he was noted to have a UTI and pneumonia. Early dementia was reportedly diagnosed by a neurologist about 2 years ago. His daughter has noticed an acute mental change over the last 8 months. His daughter and his sister are interested in pursuing assisted living options. Current Outpatient Medications   Medication Sig    aspirin delayed-release 81 mg tablet Take 1 Tab by mouth.  donepezil (ARICEPT) 10 mg tablet Take 1 Tab by mouth nightly.  hydroCHLOROthiazide (HYDRODIURIL) 12.5 mg tablet Take 1 Tab by mouth daily.  Indications: visible water retention, high blood pressure    atorvastatin (LIPITOR) 80 mg tablet Take 1 Tab by mouth daily. No current facility-administered medications for this visit. Past Medical History:   Diagnosis Date    Cerebellar stroke syndrome     Elevated PSA     HTN (hypertension)     Kidney stone     Memory loss     Pyuria     Vascular dementia [facility])          Past Surgical History:   Procedure Laterality Date    HX UROLOGICAL  12/10/2018    Pnbx. Trus vol 79 grams. [place] 3+4 in less than 5% of RLB. Dr. Boo Bowman.  HX UROLOGICAL  09/20/2019    TRUS Volume:  72.72 cm3 . [place] 7 (3+4) R Lat Base, R Lat Mid. PSA 6.0  Dr Ellsworth Kid:  Mr. Monty Ortega was accompanied by his sister for his initial interview. His daughter joined via conference call. Consistent with records, Mr. Monty Ortega' family has noted memory loss as far back as 2003. It became especially worse when he relocated from [facility] to [place] several years ago. Mr. Monty Ortega, however, does not have insight into his memory loss. Neurologic history is negative for seizures, syncope, and head trauma. History is, however, significant for stroke. Sleep disturbance is significant for difficulties with sleep maintenance; Mr. Monty Ortega naps during the day. Snoring was denied. Pain complaints were denied. There is no significant history of alcohol, tobacco, or illicit substance use. Family history of neurologic illness is significant for stroke in the mother. With regard to emotional functioning, Mr. Monty Ortega denied a significant history of psychiatric illness. There is no history of psychiatric hospitalization. Socially, Mr. Monty Ortega relocated from [facility] several years ago. This occurred after the death of his wife. Mr. Monty Ortega was  for 20 years although when asked, he stated he had been  for 10. Similarly, when asked how many children he has, he indicated that he has 1 when in fact he has to.   Academically, Mr. Monty Ortega completed 10 years of education and later worked in the [employer] industry. He retired in 2007 though when asked, indicated he retired 2 years ago. Currently, Mr. Merrill Colbert is living with his sister. He moved in following the episode of AMS several months ago. Family is considering assisted living in nursing home options. Hobbies include watching television. Functionally, Mr. Merrill Colbert is dependent on his sister for medication management. She indicated that he has begun refusing to take his Aricept stating \"it is too many medications\". His daughter has medical and financial POA and manages bill payment. Mr. Merrill Colbert has not driven for approximately 10 years. MENTAL STATUS:    Sensorium  Awake, Aware, Alert   Orientation person, day of week and month of year   Relations passive   Eye Contact appropriate   Appearance:  age appropriate   Motor Behavior:  gait unsteady   Speech:  increased latency of response and monotone   Vocabulary average   Thought Process: within normal limits   Thought Content free of delusions and free of hallucinations   Suicidal ideations none   Homicidal ideations none   Mood:  euthymic   Affect:  depressed   Memory recent  impaired   Memory remote:  impaired   Concentration:  impaired   Abstraction:  concrete   Insight:  limited   Reliability poor   Judgment:  limited         DIAGNOSTIC IMPRESSIONS:  1. Cognitive Decline: R/O Major Neurocognitive Disorder  2. Anxiety about health      PLAN:  1. Complete a comprehensive neuropsychological assessment to provide a differential diagnosis of presenting concerns as well as to assist with disposition and treatment planning as appropriate. 2. Consider compensatory and remedial cognitive training. 3. Consider nonpharmacological interventions for mood disorder. 4. Consider an adaptive driving evaluation. 5. Consider referral for elder health nurse to provide an in-home functional assessment.   6. Consider placement issues to provide greater structure and supervision to ensure safety, health and well-being. [id number] x 1 Review of records. Face to face interview w/ patient. Determine test protocol: 60 minutes. Total 1 unit      Hakan Sanches, PHD  Licensed Clinical Psychologist    This note was created using voice recognition software. Despite editing, there may be syntax errors. This note will not be viewable in [employer] for the following reason(s). This is a Psychotherapy Note.  ([address], [facility] Providers Only)

## 2021-02-10 ENCOUNTER — OFFICE VISIT (OUTPATIENT)
Dept: NEUROLOGY | Age: 66
End: 2021-02-10
Payer: COMMERCIAL

## 2021-02-10 DIAGNOSIS — R41.3 MEMORY LOSS: Primary | ICD-10-CM

## 2021-02-10 DIAGNOSIS — R41.844 EXECUTIVE FUNCTION DEFICIT: ICD-10-CM

## 2021-02-10 DIAGNOSIS — R41.840 ATTENTION AND CONCENTRATION DEFICIT: ICD-10-CM

## 2021-02-10 DIAGNOSIS — F41.8 ANXIETY ABOUT HEALTH: ICD-10-CM

## 2021-02-10 PROCEDURE — 90791 PSYCH DIAGNOSTIC EVALUATION: CPT | Performed by: PSYCHOLOGIST

## 2021-02-12 ENCOUNTER — OFFICE VISIT (OUTPATIENT)
Dept: NEUROLOGY | Age: 66
End: 2021-02-12
Payer: COMMERCIAL

## 2021-02-12 DIAGNOSIS — F41.8 ANXIETY ABOUT HEALTH: ICD-10-CM

## 2021-02-12 DIAGNOSIS — F01.518 VASCULAR DEMENTIA WITH BEHAVIOR DISTURBANCE: Primary | ICD-10-CM

## 2021-02-12 PROCEDURE — 96137 PSYCL/NRPSYC TST PHY/QHP EA: CPT | Performed by: PSYCHOLOGIST

## 2021-02-12 PROCEDURE — 96132 NRPSYC TST EVAL PHYS/QHP 1ST: CPT | Performed by: PSYCHOLOGIST

## 2021-02-12 PROCEDURE — 96133 NRPSYC TST EVAL PHYS/QHP EA: CPT | Performed by: PSYCHOLOGIST

## 2021-02-12 PROCEDURE — 96138 PSYCL/NRPSYC TECH 1ST: CPT | Performed by: PSYCHOLOGIST

## 2021-02-12 PROCEDURE — 96139 PSYCL/NRPSYC TST TECH EA: CPT | Performed by: PSYCHOLOGIST

## 2021-02-12 PROCEDURE — 96136 PSYCL/NRPSYC TST PHY/QHP 1ST: CPT | Performed by: PSYCHOLOGIST

## 2021-02-12 NOTE — PROGRESS NOTES
[facility]  Neuroscience   [facility]. [facility], [address]  Office:  [phone]  Fax: [phone]                Neuropsychological Evaluation Report    Patient Name: Orlando Al  Age: 76 y.o. Gender: male   Handedness: right handed   Presenting Concern: cognitive complaints  Primary Care Physician/Referring Provider: Anay Irwin MD    PATIENT HISTORY (OBTAINED DURING INITIAL CLINICAL EVALUATION):    REASON FOR REFERRAL:  This comprehensive and medically necessary neuropsychological assessment was requested to assist a differential diagnosis of cognitive complaints. The use and purpose of this examination, as well as the extent and limitations of confidentiality, were explained prior to obtaining permission to participate. Instructions were provided regarding the necessity to put forth optimal effort and answer questions truthfully in order to obtain reliable and accurate test results. REVIEW OF RECORDS:  Mr. Norman Sadler was referred by family medicine for AMS. His daughter has POA but Mr. Norman Sadler resides independently. Records indicate that on 2021, Mr. Norman Sadler left his home to go to the rental office but got lost and was noted by a neighbor to be wandering around the neighborhood. The police were called and EMS arrived to have the patient assessed at the hospital.  At the hospital, he was noted to have a UTI and pneumonia. Early dementia was reportedly diagnosed by a neurologist about 2 years ago. His daughter has noticed an acute mental change over the last 8 months. His daughter and his sister are interested in pursuing assisted living options. An MRI of the brain on 2018 showed the followin. No evidence of acute infarct or other acute intracranial finding. 2. Chronic infarct left middle cerebellar peduncle.  Differential diagnosis  includes chronic area of demyelination although most likely represents chronic  infarct.     3. Extensive bilateral bilateral deep white matter as well as basal ganglia,  thalamic, pontine and cerebellar white matter signal changes nonspecific, likely  chronic microvascular ischemic disease. Differential diagnosis less likely  includes previous demyelinating process.     4. 6 mm enhancing marrow lesion right occipital condyle and nonspecific  heterogeneous skull marrow signal. If there is known primary neoplasm,  possibility of osseous metastatic disease should be considered. Alternatively,  enhancing occipital condyle lesion could represent atypical hemangioma, and  heterogeneous skull marrow changes could represent marrow regenerative changes.     5. Paranasal sinus inflammatory changes, fluid level left maxillary sinus,  cannot exclude acute sinusitis. Current Outpatient Medications   Medication Sig    aspirin delayed-release 81 mg tablet Take 1 Tab by mouth.  donepezil (ARICEPT) 10 mg tablet Take 1 Tab by mouth nightly.  hydroCHLOROthiazide (HYDRODIURIL) 12.5 mg tablet Take 1 Tab by mouth daily. Indications: visible water retention, high blood pressure    atorvastatin (LIPITOR) 80 mg tablet Take 1 Tab by mouth daily. No current facility-administered medications for this visit. Past Medical History:   Diagnosis Date    Cerebellar stroke syndrome     Elevated PSA     HTN (hypertension)     Kidney stone     Memory loss     Pyuria     Vascular dementia [facility])        CLINICAL INTERVIEW:  Mr. Daniel Blanco was accompanied by his sister for his initial interview. His daughter joined via conference call. Consistent with records, Mr. Daniel Blanco' family has noted memory loss as far back as 2003. It became especially worse when he relocated from [facility] to [place] several years ago. Mr. Daniel Blanco, however, does not have insight into his memory loss. Neurologic history is negative for seizures, syncope, and head trauma. History is, however, significant for stroke. Sleep disturbance is significant for difficulties with sleep maintenance; Mr. Carlos Nicole naps during the day. Snoring was denied. Pain complaints were denied. There is no significant history of alcohol, tobacco, or illicit substance use. Family history of neurologic illness is significant for stroke in the mother. With regard to emotional functioning, Mr. Carlos Nicole denied a significant history of psychiatric illness. There is no history of psychiatric hospitalization. Socially, Mr. Carlos Nicole relocated from [facility] several years ago. This occurred after the death of his wife. Mr. Carlos Nicole was  for 20 years although when asked, he stated he had been  for 10. Similarly, when asked how many children he has, he indicated that he has one when in fact he has two. Academically, Mr. Carlos Nicole completed 10 years of education and later worked in the [employer] industry. He retired in 2007 though when asked, indicated he retired 2 years ago. Currently, Mr. Carlos Nicole is living with his sister. He moved in following the episode of AMS several months ago. Family is considering assisted living and nursing home options. Hobbies include watching television. Functionally, Mr. Carlos Nicole is dependent on his sister for medication management. She indicated that he has begun refusing to take his Aricept stating, \"it is too many medications\". His daughter has medical and financial POA and manages bill payment. Mr. Carlos Nicole has not driven for approximately 10 years.       MENTAL STATUS:    Sensorium  Awake, Aware, Alert   Orientation person, day of week and month of year   Relations passive   Eye Contact appropriate   Appearance:  age appropriate   Motor Behavior:  gait unsteady   Speech:  increased latency of response and monotone   Vocabulary average   Thought Process: within normal limits   Thought Content free of delusions and free of hallucinations   Suicidal ideations none   Homicidal ideations none Mood:  euthymic   Affect:  depressed   Memory recent  impaired   Memory remote:  impaired   Concentration:  impaired   Abstraction:  concrete   Insight:  limited   Reliability poor   Judgment:  limited       METHODS OF ASSESSMENT (Current Evaluation):  Clinician Administered:  Clock Drawing Task  Geriatric Depression Scale: Short Form (GDS)  Mini Mental State Examination (MMSE)    Technician Administered: Adaptive Behavior Assessment System (ABAS-3)  Clinical Assessment of Geriatric Emotional Status (CASE)  Neuropsychological Assessment Battery-Select Subtests  Test of Premorbid Functioning  [place] Functional Living Scale  Trailmaking Test    TEST OBSERVATIONS:  Mr. Divina Ocampo arrived promptly for the testing session. Dress and grooming were appropriate; physical presentation was unchanged from that observed during the clinical interview. Speech was mumbled and aphasic. Comprehension difficulties were noted for both simple and complex instructions. Motor functions were slow and uncoordinated. Thought process was confused. Thought content showed no apparent delusional ideation. Auditory and visual hallucinations were denied and there was no obvious response to internal stimuli. Affect was congruent with the withdrawn mood conveyed. Mr. Divina Ocampo was adequately cooperative but did show some limited frustration tolerance during testing. Rapport with the examiner was adequately established and maintained. Accordingly, test findings below do not appear to be the product of disingenuous effort. Given the above observations, plus comments contained in the Mental Status section, the results of this examination are regarded as reasonably reliable and valid. TEST RESULTS:  Quantitative test results are derived from comparisons to age and education corrected cohort normative data, where applicable. Percentiles are included in these instances.   Qualitative test results are determined using clinical observations. General Orientation and Awareness:       Orientation to person yes   Time no  Place yes  Circumstance yes                     Sensory-Perceptual and Motor Functioning:    Visual and auditory acuity:  Within functional limits     Gait and balance:   Ambulated independently                  Cognitive Screening: On the MMSE, Mr. Dg Chun showed difficulties in the following areas: Orientation (misidentified the year, date, time of day, and city), mental reversal, immediate spontaneous and cued recall, writing, and figure copy. Clock drawing was significant for inability to put numbers or hands on the clock. Attention/Concentration:       Simple visuomotor tracking                     Discontinued due to time and confusion    Language:          Auditory comprehension (<1 percentile)                    Severely Impaired   Naming (<1 percentile)            Severely Impaired    Dementia Rating Scale -2  Scale:     Age-Corrected MOANS Scaled Score  Percentile  Attention     2       <1  Initiation and Preservation   2       <1   Construction    2       <1  Conceptualization    2       <1  Memory     2       <1  Total Score     2       <1    Executive Functions:  Cognitive Flexibility (N/A)       Discontinued due to time and confusion    Intellectual and Basic Cognitive Functioning:  ACS Test of pre-morbid functioning reading recognition lower limit estimated WAIS-IV FSIQ score:       Estimated full scale IQ: 71   Percentile: 3     Rating: Borderline    Adaptive Behavior Measure for Independent Living [facility] Functional Living Scale):  Time                 <2 percentile    Extremely Low  Money and calculation   <2 percentile    Extremely Low  Communication     <2 percentile    Extremely Low  Memory      <2 percentile    Extremely Low  Total                 <1 percentile    Exteremly Low    Adaptive Behavior (Adaptive Behavior Assessment System)  General Adaptive Composite:  60   Percentile: <1  Severely Impaired   Conceptual:  57    Percentile: <1  Severely Impaired   Social:  62    Percentile: 1  Severely Impaired   Practical:  64    Percentile: 1  Severely Impaired    Emotional Functioning:  Screening of Emotional/Psychiatric Status:  Level of self-reported depression   (4/15)  Normal    On a measure of general emotional functioning completed by Mr. Payton Murphy sister, she reported observing the following: Anxiety, cognitive deficits, depressed mood, irritability/agitation, repetitive thoughts and behaviors, paranoia, and psychotic symptoms. IMPRESSIONS/RECOMMENDATIONS:  Test performance is consistent with a severe dementia involving significant functional impairment. With regard to etiology, I suspect a predominance of vascular pathology though Alzheimer's disease may also be playing a role. Unfortunately, the degree of deficits observed suggest that Mr. Payton Murphy will require assistance across IADLs, and quite possibly ADL care. He will also require 24/7 supervision. If this level of care exceeds what family is able to provide, they are encouraged to look at respite care and in-home options or to consider placement in a residential facility. It should be noted that Mr. Payton Murphy is lacking the cognitive capacity to make medical and financial decisions, vote, , or to own a firearm. Guardianship should be assigned. Coupled with cognitive deficits, Mr. Payton Murphy is displaying neuropsychiatric features including dysphoric mood, anxiety, and irritability. For this reason, pharmacotherapy might be beneficial.  Nonpharmacologic interventions will also be essential.  To assist with this, and handout will be provided to family at the time of feedback. DIAGNOSTIC IMPRESSIONS:  1. Vascular Dementia, severe with behavioral disturbance (anxiety, dysphoric mood, irritability/agitation, paranoia)  2.  Anxiety about health    Thank you for allowing me the opportunity to assist you in Mr. Patrice Mejia care. Please do not hesitate to contact me should you have additional questions that I may not have addressed. 50962 x 1  96137 x 1  96138 x 1  96139 x 4  96132 x 1  96133 x 1    [facility] Cait Del Rosario, Ph.D.   Licensed Clinical Psychologist        Time Documentation:     43235 x 1 Neuropsych testing/data gathering by Neuropsychologist (1 hour)   [phone] x 1  96139 x 4 Test Administration/Data Gathering By Technician: (2.5 hours). 11890 x 1 (first 30 minutes), 96139 x 4 (each additional 30 minutes)     96132 x 1  96133 x 1 Testing Evaluation Services by Neuropsychologist (1 hour, 50 minutes) 96132 x 1 (first hour), 96133 x 1 (50 minutes)     The above includes: Record review. Review of history provided by patient. Review of collaborative information. Testing by Clinician. Review of raw data. Scoring. Report writing of individual tests administered by Clinician. Integration of individual tests administered by psychometrist with NSE/testing by clinician, review of records/history/collaborative information, case Conceptualization, treatment planning, clinical decision making, report writing, coordination Of Care. Psychometry test codes as time spent by psychometrist administering and scoring neurocognitive/psychological tests under supervision of neuropsychologist.  Integral services including scoring of raw data, data interpretation, case conceptualization, report writing etcetera were initiated after the patient finished testing/raw data collected and was completed on the date the report was signed. This note was created using voice recognition software. Despite editing, there may be syntax errors. This note will not be viewable in [employer] for the following reason(s). This is a Psychotherapy Note.  ([address], [facility] Providers Only)          I have reviewed the documentation provided by Dr. Wyatt Montgomery, PhD, Selena Pallas. Dr. Cait Del Rosario is in her second year of Fellowship in Clinical Neuropsychology. Dr. Arminda Cabrera is licensed and credentialed to practice in the [facility], is providing the current services via her NPI and licensure, and had been providing similar services prior to her employment with [employer]. No additional insurance billing is done by me on her cases, my NPI is not being used, etc.   I have not had any face to face engagement with her patients, and am providing supervision and consultation services with her as she works towards advancing her career and subspecialities. I have reviewed the history, the neurocognitive and psychological test results, the medical records available, and the impressions and recommendations generated by Dr. Arminda Cabrera. We have engaged in peer to peer supervision as needed. I have reviewed history noted in the records, the tests administered, the test scores, and the overall case history and profile and report generated by Dr. Arminda Cabrera. Dr. Sandi Mckeon clinical case formulation, diagnostic impressions, and the proposed management plans/treatment recommendations are her own and based on her clinical training, level of expertise, and judgment. Any additional comments, concerns, or recommendations that I am making are offered here: For this young patient to be showing such severe and global neurocognitive disorder/decline is quite concerning. In addition, the report that these issues have been going on since at least 2003 and that he worked until 2007 (or is a 2 years ago?)  Is all confusing to me. The profile is not consistent with a pseudodementia type concern, but if all of the cognitive issues he is showing are purely organic in etiology, then unfortunately his prognosis is very poor. I do recommend assisted living level care for him. DOROTHEA Diallo  Neuropsychology

## 2021-03-03 ENCOUNTER — VIRTUAL VISIT (OUTPATIENT)
Dept: NEUROLOGY | Age: 66
End: 2021-03-03
Payer: COMMERCIAL

## 2021-03-03 DIAGNOSIS — F01.518 VASCULAR DEMENTIA WITH BEHAVIOR DISTURBANCE: Primary | ICD-10-CM

## 2021-03-03 DIAGNOSIS — F41.8 ANXIETY ABOUT HEALTH: ICD-10-CM

## 2021-03-03 PROCEDURE — 90846 FAMILY PSYTX W/O PT 50 MIN: CPT | Performed by: PSYCHOLOGIST

## 2021-03-03 NOTE — PROGRESS NOTES
Interactive Psychotherapy/office feedback        Interactive office feedback session with Mr. Brandi Thurston' adult daughter. I reviewed the results of the recent Neuropsychological Evaluation  including the observed areas of neurocognitive strengths and weaknesses. Education was provided regarding my diagnostic impressions, and treatment plan/options were discussed. I also answered numerous questions related to the clinical findings, including the various methods to improve cognition and mood. CBT, psychoeducation, and supportive psychotherapy techniques were utilized. Prior to seeing the patient I reviewed the records, including the previously completed report, the records in [facility], and any updated visits from other providers since I saw the patient last.       Diagnoses:      1. Vascular Dementia, severe with behavioral disturbance (anxiety, dysphoric mood, irritability/agitation, paranoia)        2. Anxiety about health        The patient will follow up with the referring provider, and reported being very pleased with the services provided. Follow up with [facility]. [id number] psychotherapy with patients daughter. 45 minutes       This note was created using voice recognition software. Despite editing, there may be syntax errors. Shelly Quinteros is a 76 y.o. male who was evaluated by an audio-video encounter for concerns as above. Patient identification was verified prior to start of the visit. Pursuant to the emergency declaration under the [facility], 1135 waiver authority and the [employer] Act, this Virtual Visit was conducted, with patient's (and/or legal guardian's) consent, to reduce the patient's risk of exposure to COVID-19 and provide necessary medical care. Services were provided through a synchronous discussion virtually to substitute for in-person clinic visit. I was in the office. The patient was at home. This note will not be viewable in [employer] for the following reason(s). This is a Psychotherapy Note.  ([address], [facility] Providers Only)

## 2021-03-30 DIAGNOSIS — G46.4 CEREBELLAR STROKE SYNDROME: Primary | ICD-10-CM

## 2021-03-30 DIAGNOSIS — I10 ESSENTIAL HYPERTENSION: ICD-10-CM

## 2021-03-30 RX ORDER — ATORVASTATIN CALCIUM 80 MG/1
80 TABLET, FILM COATED ORAL DAILY
Qty: 90 TAB | Refills: 3 | Status: SHIPPED | OUTPATIENT
Start: 2021-03-30

## 2021-03-30 RX ORDER — HYDROCHLOROTHIAZIDE 12.5 MG/1
12.5 TABLET ORAL DAILY
Qty: 90 TAB | Refills: 3 | Status: SHIPPED | OUTPATIENT
Start: 2021-03-30 | End: 2022-09-15

## 2021-05-25 ENCOUNTER — TELEPHONE (OUTPATIENT)
Dept: FAMILY MEDICINE CLINIC | Age: 66
End: 2021-05-25

## 2021-05-25 NOTE — TELEPHONE ENCOUNTER
Called to make a follow up appointment with patient he is in a rehab center, spoke with his nurse who states he will be seeing at the facility